# Patient Record
Sex: FEMALE | Race: WHITE | NOT HISPANIC OR LATINO | Employment: OTHER | URBAN - METROPOLITAN AREA
[De-identification: names, ages, dates, MRNs, and addresses within clinical notes are randomized per-mention and may not be internally consistent; named-entity substitution may affect disease eponyms.]

---

## 2023-03-08 ENCOUNTER — TRANSFERRED RECORDS (OUTPATIENT)
Dept: HEALTH INFORMATION MANAGEMENT | Facility: CLINIC | Age: 68
End: 2023-03-08

## 2023-03-13 ENCOUNTER — TRANSFERRED RECORDS (OUTPATIENT)
Dept: HEALTH INFORMATION MANAGEMENT | Facility: CLINIC | Age: 68
End: 2023-03-13

## 2023-04-26 ENCOUNTER — MEDICAL CORRESPONDENCE (OUTPATIENT)
Dept: HEALTH INFORMATION MANAGEMENT | Facility: CLINIC | Age: 68
End: 2023-04-26

## 2023-05-02 NOTE — TELEPHONE ENCOUNTER
SPINE PATIENTS - NEW PROTOCOL PREVISIT    RECORDS RECEIVED FROM: DENIS   REASON FOR VISIT: SI bone fusion, 2nd opinion   Date of Appt: 05/05/2023   NOTES (FOR ALL VISITS) STATUS DETAILS   OFFICE NOTE from referring provider N/A    OFFICE NOTE from other specialist Care Everywhere 04/20/2023 PT [facility]   04/10/2023 [facility]  05/17/2022 [facility]   DISCHARGE SUMMARY from hospital N/A    DISCHARGE REPORT from ER N/A    OPERATIVE REPORT Care Everywhere 01/14/2021 Lumbar Medial Branch Radiofrequency Ablation, levels:  right L4  right L5  right Sacral ala  This corresponds to the L4/5, L5/S1 facet joints   EMG REPORT N/A    MEDICATION LIST N/A    IMAGING  (FOR ALL VISITS)     MRI (HEAD, NECK, SPINE) Received 03/13/2023 lumbar spine   XRAY (SPINE) *NEUROSURGERY* Received 03/13/2023 pelvis  02/25/2023 lumbar spine   CT (HEAD, NECK, SPINE) N/A       Images in PACS

## 2023-05-05 ENCOUNTER — PRE VISIT (OUTPATIENT)
Dept: NEUROSURGERY | Facility: CLINIC | Age: 68
End: 2023-05-05

## 2023-05-05 ENCOUNTER — OFFICE VISIT (OUTPATIENT)
Dept: NEUROSURGERY | Facility: CLINIC | Age: 68
End: 2023-05-05
Payer: MEDICARE

## 2023-05-05 VITALS
HEART RATE: 62 BPM | WEIGHT: 163.4 LBS | HEIGHT: 62 IN | DIASTOLIC BLOOD PRESSURE: 65 MMHG | BODY MASS INDEX: 30.07 KG/M2 | SYSTOLIC BLOOD PRESSURE: 105 MMHG

## 2023-05-05 DIAGNOSIS — M53.3 SI (SACROILIAC) JOINT DYSFUNCTION: Primary | ICD-10-CM

## 2023-05-05 PROCEDURE — 99204 OFFICE O/P NEW MOD 45 MIN: CPT | Performed by: ORTHOPAEDIC SURGERY

## 2023-05-05 RX ORDER — ESCITALOPRAM OXALATE 20 MG/1
20 TABLET ORAL EVERY MORNING
COMMUNITY
Start: 2022-04-20

## 2023-05-05 RX ORDER — MELOXICAM 7.5 MG/1
TABLET ORAL
COMMUNITY
Start: 2023-03-15 | End: 2023-06-15

## 2023-05-05 RX ORDER — FAMOTIDINE 20 MG/1
20 TABLET, FILM COATED ORAL EVERY EVENING
COMMUNITY
Start: 2022-11-14

## 2023-05-05 RX ORDER — VITAMIN B COMPLEX
1 TABLET ORAL EVERY MORNING
COMMUNITY

## 2023-05-05 RX ORDER — TRAZODONE HYDROCHLORIDE 50 MG/1
50 TABLET, FILM COATED ORAL AT BEDTIME
COMMUNITY
Start: 2023-03-21

## 2023-05-05 RX ORDER — IBUPROFEN 600 MG/1
600 TABLET, FILM COATED ORAL EVERY 6 HOURS PRN
Status: ON HOLD | COMMUNITY
End: 2023-07-06

## 2023-05-05 RX ORDER — LEVETIRACETAM 750 MG/1
750 TABLET ORAL 2 TIMES DAILY
COMMUNITY
Start: 2022-07-26

## 2023-05-05 NOTE — LETTER
5/5/2023         RE: Isabel Hercules  [address]  [facility] [address]        Dear Colleague,    Thank you for referring your patient, Isabel Hercules, to the [facility]UROS[facility]ERY [facility]IC [place]. Please see a copy of my visit note below.    Spine Surgery Consultation    REFERRING PHYSICIAN: Lexy Clifford   PRIMARY CARE PHYSICIAN: No primary care provider on file.           Chief Complaint:   New Patient      History of Present Illness:  Symptom Profile Including: location of symptoms, onset, severity, exacerbating/alleviating factors, previous treatments:        Isabel Hercules is a 67 year old female who presents today for evaluation of right-sided SI joint dysfunction and bursitis.  She is been dealing with this for several years but has been gradually worsening particular over the last few months.  She has done 2 rounds of physical therapy which focused on hamstring gluteal and hip abductor strengthening.  She had a greater trochanteric injection which provided no benefit.  She also had an epidural injection which was not helpful.  More recently she had an SI joint injection which was a lidocaine only injection and provided relief for about 3 days.  The symptoms occasionally radiate down the back of the leg were mostly centered around the posterior spine on the right side centered around the SI joint somewhat into the buttock and lateral thigh.  It is worse with activity and improves some with rest.  She is done Tylenol and anti-inflammatories and muscle relaxants in addition to the injections and rounds of therapy noted above.  She says the pain can be a 3 out of 10 almost every day sometimes it will spike to higher levels when she has been more active, up to 8 or 9 out of 10.  5 out of 10 is how high it gets on most days.  Her JOSÉ MIGUEL is 30.         Past Medical History:   No past medical history on file.         Past Surgical History:   No past surgical history on file.        "  Social History:     Social History     Tobacco Use     Smoking status: Not on file     Smokeless tobacco: Not on file   Substance Use Topics     Alcohol use: Not on file            Family History:   No family history on file.         Allergies:     Allergies   Allergen Reactions     Seasonal Allergies             Medications:     Current Outpatient Medications   Medication     escitalopram (LEXAPRO) 20 MG tablet     famotidine (PEPCID) 20 MG tablet     ibuprofen (ADVIL/MOTRIN) 600 MG tablet     levETIRAcetam (KEPPRA) 750 MG tablet     meloxicam (MOBIC) 7.5 MG tablet     omeprazole (PRILOSEC) 20 MG DR capsule     traZODone (DESYREL) 50 MG tablet     Vitamin D3 (CHOLECALCIFEROL) 25 mcg (1000 units) tablet     No current facility-administered medications for this visit.             Review of Systems:     A 10 point ROS was performed and reviewed. Specific responses to these questions are noted at the end of the document.         Physical Exam:   Vitals: /65 (BP Location: Right arm, Patient Position: Sitting, Cuff Size: Adult Regular)   Pulse 62   Ht 1.575 m (5' 2\")   Wt 74.1 kg (163 lb 6.4 oz)   BMI 29.89 kg/m    Constitutional: awake, alert, cooperative, no apparent distress, appears stated age.    Eyes: The sclera are white.  Ears, Nose, Throat: The trachea is midline.  Psychiatric: The patient has a normal affect.  Respiratory: breathing non-labored  Cardiovascular: The extremities are warm and perfused.  Skin: no obvious rashes or lesions.  Musculoskeletal, Neurologic, and Spine:          Lumbar Spine:    Appearance - No gross stepoffs or deformities    Motor -     L2-3: Hip flexion 5/5 R and 5/5 L strength          L3/4:  Knee extension R 5/5 and L 5/5 strength         L4/5:  Foot dorsiflexion R 5/5 L 5/5 and               S1:  Plantarflexion/Peroneal Muscles  R 5/5 and L 5/5 strength    Sensation: intact to light touch L3-S1 distribution BLE      Neurologic:      REFLEXES Left Right   Biceps 1+ 1+ "   Triceps 1+ 1+   Brachioradialis 1+ 1+   Patella 1+ 1+   Ankle jerk 1+ 1+   Babinski No upgoing great toe No upgoing great toe   Clonus 0 beats 0 beats     Hip Exam:   She is tender over the right greater trochanter.  Positive hip thrust, lateral pelvic compression anterior pelvic compression and Gaenslen's.  She has a positive Cherri finger sign.    Alignment:  Patient stands with a neutral standing sagittal balance.           Imaging:   We ordered and independently reviewed new radiographs at this clinic visit. The results were discussed with the patient.  Findings include:    Lumbar radiographs show multilevel spondylosis    Lumbar MRI shows multilevel spondylosis but no obvious severe stenosis.  There is some inflammation around the bilateral SI joints with obvious spondylosis and degenerative changes.             Assessment and Plan:   Assessment:  67 year old female with lumbar spondylosis, greater trochanteric bursitis and right SI joint dysfunction.     Plan:  We discussed that she does have multiple sources of potential pain, but the SI joint seems to be the most obvious.  She responded best to injections there and the physical exam is most consistent with SI joint pathology.  We discussed options moving forward.  She has a second injection scheduled for next week.  I like to do a phone follow-up with her to assess her progress after that injection.  At that time she will have met all standard nonoperative treatment criteria.  If the injections are helpful explained he can be repeated 3-4 times a year.  The main alternative to repeat injections and therapy would be to consider a minimally invasive SI joint fusion surgery.  I went over the risk and benefits of this including risk of infection, implant malposition, neurologic deficit, pain advancement resulting in visceral injury, risk of nonunion and incomplete symptom relief.  We also talked about the risk the contralateral side might need to be done at  some point in the future.  She is going to consider these options.  If she wishes to proceed with surgery we will discuss this further after the phone visit in a couple of weeks.      Respectfully,  Steven Nunez MD  Spine Surgery  [facility]        Again, thank you for allowing me to participate in the care of your patient.        Sincerely,        Steven Nunez MD

## 2023-05-05 NOTE — PROGRESS NOTES
Spine Surgery Consultation    REFERRING PHYSICIAN: Lexy Clifford   PRIMARY CARE PHYSICIAN: No primary care provider on file.           Chief Complaint:   New Patient      History of Present Illness:  Symptom Profile Including: location of symptoms, onset, severity, exacerbating/alleviating factors, previous treatments:        Isabel Hercules is a 67 year old female who presents today for evaluation of right-sided SI joint dysfunction and bursitis.  She is been dealing with this for several years but has been gradually worsening particular over the last few months.  She has done 2 rounds of physical therapy which focused on hamstring gluteal and hip abductor strengthening.  She had a greater trochanteric injection which provided no benefit.  She also had an epidural injection which was not helpful.  More recently she had an SI joint injection which was a lidocaine only injection and provided relief for about 3 days.  The symptoms occasionally radiate down the back of the leg were mostly centered around the posterior spine on the right side centered around the SI joint somewhat into the buttock and lateral thigh.  It is worse with activity and improves some with rest.  She is done Tylenol and anti-inflammatories and muscle relaxants in addition to the injections and rounds of therapy noted above.  She says the pain can be a 3 out of 10 almost every day sometimes it will spike to higher levels when she has been more active, up to 8 or 9 out of 10.  5 out of 10 is how high it gets on most days.  Her JOSÉ MIGUEL is 30.         Past Medical History:   No past medical history on file.         Past Surgical History:   No past surgical history on file.         Social History:     Social History     Tobacco Use     Smoking status: Not on file     Smokeless tobacco: Not on file   Substance Use Topics     Alcohol use: Not on file            Family History:   No family history on file.         Allergies:     Allergies   Allergen  "Reactions     Seasonal Allergies             Medications:     Current Outpatient Medications   Medication     escitalopram (LEXAPRO) 20 MG tablet     famotidine (PEPCID) 20 MG tablet     ibuprofen (ADVIL/MOTRIN) 600 MG tablet     levETIRAcetam (KEPPRA) 750 MG tablet     meloxicam (MOBIC) 7.5 MG tablet     omeprazole (PRILOSEC) 20 MG DR capsule     traZODone (DESYREL) 50 MG tablet     Vitamin D3 (CHOLECALCIFEROL) 25 mcg (1000 units) tablet     No current facility-administered medications for this visit.             Review of Systems:     A 10 point ROS was performed and reviewed. Specific responses to these questions are noted at the end of the document.         Physical Exam:   Vitals: /65 (BP Location: Right arm, Patient Position: Sitting, Cuff Size: Adult Regular)   Pulse 62   Ht 1.575 m (5' 2\")   Wt 74.1 kg (163 lb 6.4 oz)   BMI 29.89 kg/m    Constitutional: awake, alert, cooperative, no apparent distress, appears stated age.    Eyes: The sclera are white.  Ears, Nose, Throat: The trachea is midline.  Psychiatric: The patient has a normal affect.  Respiratory: breathing non-labored  Cardiovascular: The extremities are warm and perfused.  Skin: no obvious rashes or lesions.  Musculoskeletal, Neurologic, and Spine:          Lumbar Spine:    Appearance - No gross stepoffs or deformities    Motor -     L2-3: Hip flexion 5/5 R and 5/5 L strength          L3/4:  Knee extension R 5/5 and L 5/5 strength         L4/5:  Foot dorsiflexion R 5/5 L 5/5 and               S1:  Plantarflexion/Peroneal Muscles  R 5/5 and L 5/5 strength    Sensation: intact to light touch L3-S1 distribution BLE      Neurologic:      REFLEXES Left Right   Biceps 1+ 1+   Triceps 1+ 1+   Brachioradialis 1+ 1+   Patella 1+ 1+   Ankle jerk 1+ 1+   Babinski No upgoing great toe No upgoing great toe   Clonus 0 beats 0 beats     Hip Exam:   She is tender over the right greater trochanter.  Positive hip thrust, lateral pelvic compression anterior " pelvic compression and Gaenslen's.  She has a positive Cherri finger sign.    Alignment:  Patient stands with a neutral standing sagittal balance.           Imaging:   We ordered and independently reviewed new radiographs at this clinic visit. The results were discussed with the patient.  Findings include:    Lumbar radiographs show multilevel spondylosis    Lumbar MRI shows multilevel spondylosis but no obvious severe stenosis.  There is some inflammation around the bilateral SI joints with obvious spondylosis and degenerative changes.             Assessment and Plan:   Assessment:  67 year old female with lumbar spondylosis, greater trochanteric bursitis and right SI joint dysfunction.     Plan:  1. We discussed that she does have multiple sources of potential pain, but the SI joint seems to be the most obvious.  She responded best to injections there and the physical exam is most consistent with SI joint pathology.  2. We discussed options moving forward.  She has a second injection scheduled for next week.  I like to do a phone follow-up with her to assess her progress after that injection.  At that time she will have met all standard nonoperative treatment criteria.  3. If the injections are helpful explained he can be repeated 3-4 times a year.  The main alternative to repeat injections and therapy would be to consider a minimally invasive SI joint fusion surgery.  I went over the risk and benefits of this including risk of infection, implant malposition, neurologic deficit, pain advancement resulting in visceral injury, risk of nonunion and incomplete symptom relief.  We also talked about the risk the contralateral side might need to be done at some point in the future.  She is going to consider these options.  If she wishes to proceed with surgery we will discuss this further after the phone visit in a couple of weeks.      Respectfully,  Steven Nunez MD  Spine Surgery  [facility]  [place]

## 2023-05-18 ENCOUNTER — TELEPHONE (OUTPATIENT)
Dept: NEUROSURGERY | Facility: CLINIC | Age: 68
End: 2023-05-18
Payer: MEDICARE

## 2023-05-18 NOTE — TELEPHONE ENCOUNTER
Left Voicemail (1st Attempt) for the patient to call back and schedule the following:    Appointment type: Return  Provider: Dr. Nunez  Return date: Mon 5/22 or Tue 2[place]23 [place] [facility] [place]  Specialty phone number: [phone]  Additional appointment(s) needed:   Additonal Notes:     Dr. Nunez will be doing urgent surgery on 5/19/2023, the appointment needs to be rescheduled. Patient can see Dr. Nunez at the [facility] [place] on 5/22/2023 or Tues 5/23/2023, or rescheduled to the next available appointment in .    Also sent a REVENTIVE message.    Adelina R/Procedure    [facility]   Neurology, NeuroSurgery, NeuroPsychology and Pain Management Specialties  Medical/Surgical Adult Specialties

## 2023-05-19 ENCOUNTER — TELEPHONE (OUTPATIENT)
Dept: NEUROSURGERY | Facility: CLINIC | Age: 68
End: 2023-05-19

## 2023-05-19 ENCOUNTER — VIRTUAL VISIT (OUTPATIENT)
Dept: NEUROSURGERY | Facility: CLINIC | Age: 68
End: 2023-05-19
Payer: MEDICARE

## 2023-05-19 DIAGNOSIS — M53.3 SI (SACROILIAC) JOINT DYSFUNCTION: Primary | ICD-10-CM

## 2023-05-19 PROCEDURE — 99442 PR PHYSICIAN TELEPHONE EVALUATION 11-20 MIN: CPT | Performed by: ORTHOPAEDIC SURGERY

## 2023-05-19 NOTE — PROGRESS NOTES
Isabel is a 67 year old who is being evaluated via a billable telephone visit.      What phone number would you like to be contacted at? [phone]  How would you like to obtain your AVS? Alis       Diagnosis: SI Joint Pain    Isabel had about 5-6 hours of relief after her SI joint injection.  After the injection she had 100% relief during this time.   She feels like she was in the spot and they found it during the injection.    The question is what we should do next.  We discussed continued non-op management with injections such as continued PT, oral medications and repeating injections, or an SI belt.    The other alternative would be a minimally invasive SI joint fusion.  We discussed risks including incomplete symptom relief, risk of contralateral pain, risk of infection, implant malposition, risk of infection, and also the recovery and expected outcomes after surgery.  She had a number of intelligent questions.      She would like to try the minimally invasive SI fusion in July.

## 2023-05-19 NOTE — LETTER
5/19/2023         RE: Isabel Hercules  [address]  [facility] [address]        Dear Colleague,    Thank you for referring your patient, Isabel Hercules, to the [facility]OS[facility]IC [place]. Please see a copy of my visit note below.    Isabel is a 67 year old who is being evaluated via a billable telephone visit.      What phone number would you like to be contacted at? [phone]  How would you like to obtain your AVS? Alis       Diagnosis: SI Joint Pain    Isabel had about 5-6 hours of relief after her SI joint injection.  After the injection she had 100% relief during this time.   She feels like she was in the spot and they found it during the injection.    The question is what we should do next.  We discussed continued non-op management with injections such as continued PT, oral medications and repeating injections, or an SI belt.    The other alternative would be a minimally invasive SI joint fusion.  We discussed risks including incomplete symptom relief, risk of contralateral pain, risk of infection, implant malposition, risk of infection, and also the recovery and expected outcomes after surgery.  She had a number of intelligent questions.      She would like to try the minimally invasive SI fusion in July.        Again, thank you for allowing me to participate in the care of your patient.        Sincerely,        Steven Nunez MD

## 2023-05-19 NOTE — TELEPHONE ENCOUNTER
Writer spoke with patient and went over SI joint injection pre-op checklist.     Pt confirmed that she is a non-smoker and has completed extensive PT over the years, including recent PT for her low back through [facility]. These notes are in C/E. She reports having completed 2 recent SI joint injections (one at [facility] and another at [facility]) but does not recall if these were diagnostic or therapeutic. Writer will review records to verify. Pt recalls an SI joint or Xray of her pelvis through [facility] and MRI or CT of her pelvis. Confirmed recent lumbar MRI in PACS. Will notify pt if additional orders need to be placed.         Afia Swartz, RNCC  Neurology/Neurosurgery

## 2023-05-22 NOTE — TELEPHONE ENCOUNTER
Writer confirmed with [facility] that SI joint injection performed in March was a therapeutic injection and kenalog was also administered with anesthetic. For insurance purposes, she will need another diagnostic injection with at least 75% improvement. Pt will also require CT of the pelvis within the last 6 months which has not been completed after looking through records. Pt requests both orders be sent to [facility] and that SI joint injection be done with conscious sedation if possible. Orders placed and to be faxed to [facility] to scheduling.       Afia Swartz, RNCC  Neurology/Neurosurgery

## 2023-05-23 NOTE — CONFIDENTIAL NOTE
CT and SI joint injection orders faxed to [facility]. Confirmation of successful delivery was received.    Isabelle Wade RN Care Coordinator   Neurology/Neurosurgery/PM&R/ Pain Management

## 2023-05-26 ENCOUNTER — TELEPHONE (OUTPATIENT)
Dept: ORTHOPEDICS | Facility: CLINIC | Age: 68
End: 2023-05-26
Payer: MEDICARE

## 2023-05-26 NOTE — TELEPHONE ENCOUNTER
Phoned patient to get her schedule for surgery with Dr. Nunez.     Patient stated that she has a concern and would like that concern/question answered prior to scheduling.     Patient stated that she had spoken with two different people that gave her contradicting information regarding her prior authorization for her surgery approval.     Patient stated that she spoke with someone, she does not remember who, that explained that she will need an additional injection and CT in order for Medicare to approve her SI fusion.   She also spoke with someone else who said that she does not need anything else done prior to surgery--since she has medicare, she'll get her surgery approved.     Patient would like clarification before proceeding with scheduling. Explained that surgery coordinator will reach out to our PA department for their assistance, then , PA, or RN will call back by early next week.     Patient understood and is agreeable with the plan. She had no other questions or concerns.

## 2023-05-30 ENCOUNTER — TELEPHONE (OUTPATIENT)
Dept: ORTHOPEDICS | Facility: CLINIC | Age: 68
End: 2023-05-30
Payer: MEDICARE

## 2023-05-30 NOTE — TELEPHONE ENCOUNTER
RN received a voicemail from patient on Friday 05/26/2023. Patient stating she received instruction on SI Joint Fusion information and required steps for this. RN routing to appropriate department.    Mona Millan RN

## 2023-05-31 ENCOUNTER — TELEPHONE (OUTPATIENT)
Dept: ORTHOPEDICS | Facility: CLINIC | Age: 68
End: 2023-05-31
Payer: MEDICARE

## 2023-06-01 ENCOUNTER — TRANSFERRED RECORDS (OUTPATIENT)
Dept: HEALTH INFORMATION MANAGEMENT | Facility: CLINIC | Age: 68
End: 2023-06-01
Payer: MEDICARE

## 2023-06-01 NOTE — TELEPHONE ENCOUNTER
FUTURE VISIT INFORMATION      SURGERY INFORMATION:    Date: 6/22/23    Location: [facility] or    Surgeon:  Steven Nunez MD    Anesthesia Type:  general    Procedure: minimally invasive right sacroiliac joint fusion with stealth navigation and SIBONE implants    Consult: virtual visit 5/19    RECORDS REQUESTED FROM:       Primary Care Provider: Angelita Barrett APRN, CNP - [facility]

## 2023-06-05 ENCOUNTER — TELEPHONE (OUTPATIENT)
Dept: ORTHOPEDICS | Facility: CLINIC | Age: 68
End: 2023-06-05

## 2023-06-05 NOTE — TELEPHONE ENCOUNTER
[facility] Call Center    Phone Message    May a detailed message be left on voicemail: yes     Reason for Call: Other: Patient is requesting that a member of the team of Dr Nunez call her back to discuss an upcoming procedure.     Action Taken: Message routed to:  [facility] ([facility]): Patient is requesting that a member of the team of Dr Nunez call her back to discuss an upcoming procedure.    Travel Screening: Not Applicable

## 2023-06-05 NOTE — TELEPHONE ENCOUNTER
Phoned patient to reschedule her surgery with Dr Nunez. Patient rescheduled from 6/22/23 to 7/12/23. Patient will maintain her current PAC visit and appt with Dr Nunez in June.

## 2023-06-05 NOTE — CONFIDENTIAL NOTE
RN called the pt to discuss her questions. She has an 8 hour long road trip planned for July 4th and wonders if this would be too soon to travel after having surgery on 6/22. I explained that postoperatively we advise pt's to avoid prolonged sitting or bedrest longer than 30 minutes and that she will still be partial weight bearing at that time.   She explained that typically her pain level is 3-4/10 and wonders if maybe she should wait until a little later in the summer to have surgery. She will like to know if there are any dates for surgery in late July. Will forward this on to the surgery scheduler to take a look. If she moves her appt out then she will also have to move her PAC visit as well.     She had multiple other questions for the doctor and will keep her appt with him on the 16th to discuss further.    Isabelle Wade RN Care Coordinator   Neurology/Neurosurgery/PM&R/ Pain Management

## 2023-06-15 ENCOUNTER — TELEPHONE (OUTPATIENT)
Dept: ORTHOPEDICS | Facility: CLINIC | Age: 68
End: 2023-06-15

## 2023-06-15 ENCOUNTER — ANESTHESIA EVENT (OUTPATIENT)
Dept: SURGERY | Facility: CLINIC | Age: 68
End: 2023-06-15
Payer: MEDICARE

## 2023-06-15 ENCOUNTER — LAB (OUTPATIENT)
Dept: LAB | Facility: CLINIC | Age: 68
End: 2023-06-15
Payer: MEDICARE

## 2023-06-15 ENCOUNTER — PRE VISIT (OUTPATIENT)
Dept: SURGERY | Facility: CLINIC | Age: 68
End: 2023-06-15

## 2023-06-15 ENCOUNTER — OFFICE VISIT (OUTPATIENT)
Dept: SURGERY | Facility: CLINIC | Age: 68
End: 2023-06-15
Payer: MEDICARE

## 2023-06-15 VITALS
OXYGEN SATURATION: 95 % | SYSTOLIC BLOOD PRESSURE: 111 MMHG | BODY MASS INDEX: 30.33 KG/M2 | HEIGHT: 62 IN | DIASTOLIC BLOOD PRESSURE: 66 MMHG | TEMPERATURE: 97.5 F | WEIGHT: 164.8 LBS | RESPIRATION RATE: 16 BRPM | HEART RATE: 52 BPM

## 2023-06-15 DIAGNOSIS — M53.3 SI (SACROILIAC) JOINT DYSFUNCTION: ICD-10-CM

## 2023-06-15 DIAGNOSIS — Z01.818 PRE-OP EXAMINATION: ICD-10-CM

## 2023-06-15 DIAGNOSIS — Z01.818 PRE-OP EXAMINATION: Primary | ICD-10-CM

## 2023-06-15 LAB
ANION GAP SERPL CALCULATED.3IONS-SCNC: 9 MMOL/L (ref 7–15)
BUN SERPL-MCNC: 15.1 MG/DL (ref 8–23)
CALCIUM SERPL-MCNC: 9.6 MG/DL (ref 8.8–10.2)
CHLORIDE SERPL-SCNC: 104 MMOL/L (ref 98–107)
CREAT SERPL-MCNC: 0.72 MG/DL (ref 0.51–0.95)
DEPRECATED HCO3 PLAS-SCNC: 28 MMOL/L (ref 22–29)
ERYTHROCYTE [DISTWIDTH] IN BLOOD BY AUTOMATED COUNT: 13.1 % (ref 10–15)
GFR SERPL CREATININE-BSD FRML MDRD: >90 ML/MIN/1.73M2
GLUCOSE SERPL-MCNC: 94 MG/DL (ref 70–99)
HCT VFR BLD AUTO: 44.8 % (ref 35–47)
HGB BLD-MCNC: 14.9 G/DL (ref 11.7–15.7)
MCH RBC QN AUTO: 32 PG (ref 26.5–33)
MCHC RBC AUTO-ENTMCNC: 33.3 G/DL (ref 31.5–36.5)
MCV RBC AUTO: 96 FL (ref 78–100)
PLATELET # BLD AUTO: 275 10E3/UL (ref 150–450)
POTASSIUM SERPL-SCNC: 4.1 MMOL/L (ref 3.4–5.3)
RBC # BLD AUTO: 4.66 10E6/UL (ref 3.8–5.2)
SODIUM SERPL-SCNC: 141 MMOL/L (ref 136–145)
WBC # BLD AUTO: 4.7 10E3/UL (ref 4–11)

## 2023-06-15 PROCEDURE — 80048 BASIC METABOLIC PNL TOTAL CA: CPT | Performed by: PATHOLOGY

## 2023-06-15 PROCEDURE — 36415 COLL VENOUS BLD VENIPUNCTURE: CPT | Performed by: PATHOLOGY

## 2023-06-15 PROCEDURE — 85027 COMPLETE CBC AUTOMATED: CPT | Performed by: PATHOLOGY

## 2023-06-15 PROCEDURE — 99204 OFFICE O/P NEW MOD 45 MIN: CPT | Performed by: PHYSICIAN ASSISTANT

## 2023-06-15 ASSESSMENT — PAIN SCALES - GENERAL: PAINLEVEL: MILD PAIN (3)

## 2023-06-15 ASSESSMENT — LIFESTYLE VARIABLES: TOBACCO_USE: 1

## 2023-06-15 ASSESSMENT — ENCOUNTER SYMPTOMS: SEIZURES: 1

## 2023-06-15 NOTE — PATIENT INSTRUCTIONS
Preparing for Your Surgery      Name:  Isabel Hercules   MRN:  [id number]   :  1955   Today's Date:  6/15/2023       Arriving for surgery:  Surgery date:  23  Arrival time:  9:30 am    Please come to:     Please come to:      [facility] [address]  [address] [place]  [address]  The Green Ramp for patients and visitors is located beneath the [facility]. The parking facility entrance is at the intersection of [address] [place] [address] and [address]. Patients and visitors who self-park will receive the reduced hospital parking rate (no ticket validation needed).  fitogram parking, located at the [facility] main entrance on [address] [place] [address], is available Monday - Friday from 7 am to 3:30 pm.  Discounted parking pass options can be purchased from  attendants during business hours.  -Check in at the security desk in the [facility] ([facility]) Lobby. They will direct you to the correct elevators.  -Proceed to the 3rd floor, check in at the Adult Surgery Waiting Lounge. [phone]  If you are in need of directions, a wheelchair or escort please stop at the Information Desk in the lobby.  Inform the information person that you are here for surgery; a wheelchair and escort to [address] will be provided.   An escort to the Adult Surgery Waiting Lounge will be provided.    What can I eat or drink?  -  You may eat and drink normally up to 8 hours prior to arrival time. (Until 1:30 am)  -  You may have clear liquids until 2 hours prior to arrival time. (Until 7:30 am)    Examples of clear liquids:  Water  Clear broth  Juices (apple, white grape, white cranberry  and cider) without pulp  Noncarbonated, powder based beverages  (lemonade and Ubaldo-Aid)  Sodas (Sprite, 7-Up, ginger ale and seltzer)  Coffee or tea (without milk or cream)  Gatorade    -  No Alcohol or cannabis  products for at least 24 hours before surgery.     Which medicines can I take?    Hold Aspirin for 7 days before surgery.   Hold Multivitamins for 7 days before surgery.  Hold Supplements for 7 days before surgery.  Hold Ibuprofen (Advil, Motrin) for 3 day(s) before surgery--unless otherwise directed by surgeon.  Hold Naproxen (Aleve) for 4 days before surgery.    -  DO NOT take these medications the day of surgery:  Ibuprofen - stop 3 days before surgery  Vitamin D3    -  PLEASE TAKE these medications the night before or the day of surgery:  Escitalopram (Lexapro)  Famotidine (Pepcid)  Levetiracetam (Keppra)  Omeprazole (Prilosec)  Trazodone      How do I prepare myself?  - Please take 2 showers (one the night prior to surgery and one the morning of surgery) using Scrubcare or Hibiclens soap.    Use this soap only from the neck to your toes.     Leave the soap on your skin for one minute--then rinse thoroughly.      You may use your own shampoo and conditioner. No other hair products.   - Please remove all jewelry and body piercings.  - No lotions, deodorants or fragrance.  - No makeup or fingernail polish.   - Bring your ID and insurance card.    -If you have a Deep Brain Stimulator, Spinal Cord Stimulator, or any Neuro Stimulator device---you must bring the remote control to the hospital.      ALL PATIENTS GOING HOME THE SAME DAY OF SURGERY ARE REQUIRED TO HAVE A RESPONSIBLE ADULT TO DRIVE AND BE IN ATTENDANCE WITH THEM FOR 24 HOURS FOLLOWING SURGERY.    Covid testing policy as of 12/06/2022  Your surgeon will notify and schedule you for a COVID test if one is needed before surgery--please direct any questions or COVID symptoms to your surgeon      Questions or Concerns:    - For any questions regarding the day of surgery or your hospital stay, please contact the Pre Admission Nursing Office at [phone].       - If you have health changes between today and your surgery, please call your surgeon.       - For  questions after surgery, please call your surgeons office.           Current Visitor Guidelines     Visiting hours: 8 a.m. to 8:30 p.m.     Patients confirmed or suspected to have symptoms of COVID 19 or flu:     No visitors allowed for adult patients.   Children (under age 18) can have 1 named visitor.     People who are sick or showing symptoms of COVID 19 or flu:    Are not allowed to visit patients--we can only make exceptions in special situations.       Please follow these guidelines for your visit:          Please maintain social distance          Masking is optional--however at times you may be asked to wear a mask for the safety of yourself and others     Clean your hands with alcohol hand . Do this when you arrive at and leave the building and patient room,    And again after you touch your mask or anything in the room.     Go directly to and from the room you are visiting.     Stay in the patient s room during your visit. Limit going to other places in the hospital as much as possible     Leave bags and jackets at home or in the car.     For everyone s health, please don t come and go during your visit. That includes for smoking   during your visit.

## 2023-06-15 NOTE — TELEPHONE ENCOUNTER
Received call from patient requesting to move up her surgery date if possible. Patient rescheduled from 7/12/23 to 7/6/23.     Patient will wait for confirmation call with regard to new arrival time.

## 2023-06-15 NOTE — H&P
Pre-Operative H & P     CC:  Preoperative exam to assess for increased cardiopulmonary risk while undergoing surgery and anesthesia.    Date of Encounter: 6/15/2023  Primary Care Physician:  No Ref-Primary, Physician     Reason for visit:   Encounter Diagnoses   Name Primary?     Pre-op examination Yes     SI (sacroiliac) joint dysfunction        HPI  Isabel Hercules is a 67 year old female who presents for pre-operative H & P in preparation for  Procedure Information     Case: [id number] Date/Time: 07/12/23 1200    Procedure: minimally invasive right sacroiliac joint fusion with stealth navigation and SIBONE implants (Sacrum)    Anesthesia type: General    Diagnosis: SI (sacroiliac) joint dysfunction [M53.3]    Pre-op diagnosis: SI (sacroiliac) joint dysfunction [M53.3]    Location:  OR  /  OR    Providers: Steven Nunez MD          The patient is a 67-year-old woman with a past medical history significant for former smoker, seizures, prediabetes, GERD, insomnia, major depression and lumbar radiculopathy.  The patient has been meeting with Dr. Nunez and last seen virtually on 5/19/2023.  They discussed surgical treatment options and the patient has been scheduled for the procedure as above.    History is obtained from the patient and chart review    Hx of abnormal bleeding or anti-platelet use: none    Menstrual history: No LMP recorded (lmp unknown). Patient is postmenopausal.:      Past Medical History  Past Medical History:   Diagnosis Date     Gastroesophageal reflux disease with esophagitis      Insomnia      MDD (major depressive disorder)      Prediabetes      Seizures (H)      SI (sacroiliac) joint dysfunction        Past Surgical History  Past Surgical History:   Procedure Laterality Date     APPENDECTOMY       ARTHROSCOPY KNEE       C/SECTION, CLASSICAL       CARPAL TUNNEL RELEASE RT/LT       CATARACT EXTRACTION       CERVICAL DISC SURGERY      C3-5     DILATION AND CURETTAGE       EXC  BRST CYST TUMR/LES OPN M/F 1/>       TONSILLECTOMY       TOTAL KNEE ARTHROPLASTY Right      wisdom teeth extraction         Prior to Admission Medications  Current Outpatient Medications   Medication Sig Dispense Refill     escitalopram (LEXAPRO) 20 MG tablet Take 20 mg by mouth every morning       famotidine (PEPCID) 20 MG tablet Take 20 mg by mouth every evening       ibuprofen (ADVIL/MOTRIN) 600 MG tablet Take 600 mg by mouth every 6 hours as needed       levETIRAcetam (KEPPRA) 750 MG tablet Take 750 mg by mouth 2 times daily       omeprazole (PRILOSEC) 20 MG DR capsule Take 20 mg by mouth every morning       traZODone (DESYREL) 50 MG tablet Take 50 mg by mouth At Bedtime       Vitamin D3 (CHOLECALCIFEROL) 25 mcg (1000 units) tablet Take 1 tablet by mouth every morning       meloxicam (MOBIC) 7.5 MG tablet TAKE 1 TABLET BY MOUTH TWICE DAILY WITH FOOD AS NEEDED FOR PAIN         Allergies  Allergies   Allergen Reactions     Bacitracin      Seasonal Allergies        Social History  Social History     Socioeconomic History     Marital status:      Spouse name: Not on file     Number of children: Not on file     Years of education: Not on file     Highest education level: Not on file   Occupational History     Not on file   Tobacco Use     Smoking status: Former     Types: Cigarettes     Quit date:      Years since quittin.4     Smokeless tobacco: Never   Vaping Use     Vaping status: Not on file   Substance and Sexual Activity     Alcohol use: Yes     Alcohol/week: 7.0 standard drinks of alcohol     Types: 7 Glasses of wine per week     Drug use: Not Currently     Sexual activity: Not on file   Other Topics Concern     Not on file   Social History Narrative     Not on file     Social Determinants of Health     Financial Resource Strain: Not on file   Food Insecurity: Not on file   Transportation Needs: Not on file   Physical Activity: Not on file   Stress: Not on file   Social Connections: Not on file  "  Intimate Partner Violence: Not on file   Housing Stability: Not on file       Family History  Family History   Problem Relation Age of Onset     Anesthesia Reaction No family hx of      Venous thrombosis No family hx of        Review of Systems  The complete review of systems is negative other than noted in the HPI or here.   Anesthesia Evaluation   Pt has had prior anesthetic. Type: General, Regional and MAC.    History of anesthetic complications   With the patient's total knee arthroplasty in 2020 she had desaturation after multiple opioids for pain control in the PACU, received a femoral block and ultimately was transferred to [facility].    ROS/MED HX  ENT/Pulmonary:     (+) tobacco use, Past use,     Neurologic:     (+) seizures, last seizure: 5 years ago, features: complex partial seizure,  (-) no CVA and no TIA   Cardiovascular:     (+) -----Previous cardiac testing   Echo: Date: Results:    Stress Test: Date: Results:    ECG Reviewed: Date: 5/1/21 Results:  Sinus bradycardia   Cath: Date: Results:      METS/Exercise Tolerance: 4 - Raking leaves, gardening    Hematologic:  - neg hematologic  ROS     Musculoskeletal: Comment: Lumbar radiculopathy    SI joint dysfunction        GI/Hepatic:     (+) GERD, Asymptomatic on medication,     Renal/Genitourinary:  - neg Renal ROS     Endo: Comment: Pre-diabetes      Psychiatric/Substance Use:     (+) psychiatric history depression and other (comment) (insomnia )     Infectious Disease:  - neg infectious disease ROS     Malignancy:  - neg malignancy ROS     Other:  - neg other ROS          /66 (BP Location: Right arm, Patient Position: Sitting, Cuff Size: Adult Regular)   Pulse 52   Temp 97.5  F (36.4  C) (Oral)   Resp 16   Ht 1.575 m (5' 2\")   Wt 74.8 kg (164 lb 12.8 oz)   LMP  (LMP Unknown)   SpO2 95%   BMI 30.14 kg/m      Physical Exam   Constitutional: Awake, alert, cooperative, no apparent distress, and appears stated age.  Eyes: Pupils " equal, round and reactive to light, extra ocular muscles intact, sclera clear, conjunctiva normal.  HENT: Normocephalic, oral pharynx with moist mucus membranes, good dentition. No goiter appreciated.   Respiratory: Clear to auscultation bilaterally, no crackles or wheezing.  Cardiovascular: Regular rate and rhythm, normal S1 and S2, and no murmur noted.  Carotids +2, no bruits. No edema. Palpable pulses to radial  DP and PT arteries.   GI: Normal bowel sounds, soft, non-distended, non-tender, no masses palpated, no hepatosplenomegaly.    Lymph/Hematologic: No cervical lymphadenopathy and no supraclavicular lymphadenopathy.  Genitourinary:  defer  Skin: Warm and dry.  No rashes at anticipated surgical site.   Musculoskeletal: Full ROM of neck. There is no redness, warmth, or swelling of the joints. Gross motor strength is normal.    Neurologic: Awake, alert, oriented to name, place and time. Cranial nerves II-XII are grossly intact. Gait is normal.   Neuropsychiatric: Calm, cooperative. Normal affect.     Prior Labs/Diagnostic Studies   All labs and imaging personally reviewed     EKG/ stress test - if available please see in ROS above       The patient's records and results personally reviewed by this provider.     Outside records reviewed from: Care Everywhere    LAB/DIAGNOSTIC STUDIES TODAY:     Latest Reference Range & Units 06/15/23 12:04   Sodium 136 - 145 mmol/L 141   Potassium 3.4 - 5.3 mmol/L 4.1   Chloride 98 - 107 mmol/L 104   Carbon Dioxide (CO2) 22 - 29 mmol/L 28   Urea Nitrogen 8.0 - 23.0 mg/dL 15.1   Creatinine 0.51 - 0.95 mg/dL 0.72   GFR Estimate >60 mL/min/1.73m2 >90   Calcium 8.8 - 10.2 mg/dL 9.6   Anion Gap 7 - 15 mmol/L 9   Glucose 70 - 99 mg/dL 94   WBC 4.0 - 11.0 10e3/uL 4.7   Hemoglobin 11.7 - 15.7 g/dL 14.9   Hematocrit 35.0 - 47.0 % 44.8   Platelet Count 150 - 450 10e3/uL 275   RBC Count 3.80 - 5.20 10e6/uL 4.66   MCV 78 - 100 fL 96   MCH 26.5 - 33.0 pg 32.0   MCHC 31.5 - 36.5 g/dL 33.3  "  RDW 10.0 - 15.0 % 13.1     Assessment      Isabel Hercules is a 67 year old female seen as a PAC referral for risk assessment and optimization for anesthesia.    Plan/Recommendations  Pt will be optimized for the proposed procedure.  See below for details on the assessment, risk, and preoperative recommendations    NEUROLOGY  - History of Seizure - Patient reports she's only had one seizure and this was a complex partial seizure. She hasn't had one since starting medications. Continue keppra  - Chronic Pain  On chronic opiates, morphine equivilant = None   -Post Op delirium risk factors:  No risk identified    ENT  - No current airway concerns.  Will need to be reassessed day of surgery.  Mallampati: III  TM: > 3    CARDIAC  - No history of CAD, Hypertension and Afib  - METS (Metabolic Equivalents)  Patient performs 4 or more METS exercise without symptoms            Total Score: 0      RCRI-Very low risk: Class 1 0.4% complication rate            Total Score: 0    ~ The patient is able to take a 10 minute walk or garden for 30 minutes but then will have more pain the next day. She denies any cardiac symptoms.     PULMONARY  - Obstructive Sleep Apnea  No current risk of obstructive sleep apnea   DENISSE Low Risk            Total Score: 1    DENISSE: Over 50 ys old      - Denies asthma or inhaler use  - Tobacco History      History   Smoking Status     Former     Types: Cigarettes     Quit date: 1984   Smokeless Tobacco     Never       GI  - GERD  Controlled on medications: Proton Pump Inhibitor  PONV High Risk  Total Score: 3           1 AN PONV: Pt is Female    1 AN PONV: Patient is not a current smoker    1 AN PONV: Intended Post Op Opioids        /RENAL  - Baseline Creatinine  0.81    ENDOCRINE    - BMI: Estimated body mass index is 30.14 kg/m  as calculated from the following:    Height as of this encounter: 1.575 m (5' 2\").    Weight as of this encounter: 74.8 kg (164 lb 12.8 oz).  Overweight (BMI 25.0-29.9)  - " Pre-diabetes - A1c 5.7 on 12/15/22    HEME  VTE Low Risk 0.26%            Total Score: 1    VTE: Greater than 59 yrs old      - No history of abnormal bleeding or antiplatelet use.      MSK  ~ SI (sacroiliac) joint dysfunction - procedure as above.     PSYCH  - MDD, insomnia - continue lexapro and trazodone.     ANESTHESIA  ~ The patient has had multiple times with anesthesia in the past. For her total knee arthroplasty she didn't have good pain control in the PACU and per report got opioids and had airway obstruction and desaturated to the 70's. (Please see further record under the anesthesia tab from the 6/10/20 surgery) She received a femoral block but continued to have issues so was transferred to [facility]. Per discharge summary the rest of her post op course was uneventful. I reviewed her anesthesia record with Dr. Gilbert who felt she likely had respiratory depression from the opioids and perhaps part of the spinal she received. He didn't feel that any of the documentation pointed towards a medication allergy with dilaudid. The patient does reports in general she is sensitive and doesn't want to be on a lot of opioids if possible post operatively.     Different anesthesia methods/types have been discussed with the patient, but they are aware that the final plan will be decided by the assigned anesthesia provider on the date of service.    Patient discussed with Dr. Gilbert.     The patient is optimized for their procedure. AVS with information on surgery time/arrival time, meds and NPO status given by nursing staff. No further diagnostic testing indicated.      On the day of service:     Prep time: 9 minutes  Visit time: 20 minutes  Documentation time: 20 minutes  ------------------------------------------  Total time: 49 minutes      Bertha Leung PA-C  Preoperative Assessment Center  [facility]  Clinic and Surgery Center  Phone: [phone]  Fax: [phone]

## 2023-06-16 ENCOUNTER — OFFICE VISIT (OUTPATIENT)
Dept: NEUROSURGERY | Facility: CLINIC | Age: 68
End: 2023-06-16
Payer: MEDICARE

## 2023-06-16 ENCOUNTER — TELEPHONE (OUTPATIENT)
Dept: NEUROSURGERY | Facility: CLINIC | Age: 68
End: 2023-06-16

## 2023-06-16 VITALS
HEART RATE: 66 BPM | DIASTOLIC BLOOD PRESSURE: 67 MMHG | SYSTOLIC BLOOD PRESSURE: 110 MMHG | BODY MASS INDEX: 30 KG/M2 | WEIGHT: 164 LBS

## 2023-06-16 DIAGNOSIS — M53.3 SI (SACROILIAC) JOINT DYSFUNCTION: Primary | ICD-10-CM

## 2023-06-16 PROCEDURE — 99214 OFFICE O/P EST MOD 30 MIN: CPT | Performed by: ORTHOPAEDIC SURGERY

## 2023-06-16 NOTE — TELEPHONE ENCOUNTER
A user error has taken place: encounter opened in error, closed for administrative reasons.  NANY Schwarz, NAIF ([facility])

## 2023-06-16 NOTE — PROGRESS NOTES
Spine Surgery Return Clinic Visit      Chief Complaint:   RECHECK      Interval HPI:  Symptom Profile Including: location of symptoms, onset, severity, exacerbating/alleviating factors, previous treatments:        Isabel Hercules is a 67 year old female who returns in follow-up today.  We are planning on a minimally invasive SI joint fusion and she returns with her  today with a number of intelligent questions.  The entirety of today's visit was spent in counseling imaging review and surgical decision making.            Past Medical History:     Past Medical History:   Diagnosis Date     Gastroesophageal reflux disease with esophagitis      Insomnia      MDD (major depressive disorder)      Prediabetes      Seizures (H)      SI (sacroiliac) joint dysfunction             Past Surgical History:     Past Surgical History:   Procedure Laterality Date     APPENDECTOMY       ARTHROSCOPY KNEE       C/SECTION, CLASSICAL       CARPAL TUNNEL RELEASE RT/LT       CATARACT EXTRACTION       CERVICAL DISC SURGERY      C3-5     DILATION AND CURETTAGE       EXC BRST CYST TUMR/LES OPN M/F 1/>       TONSILLECTOMY       TOTAL KNEE ARTHROPLASTY Right      wisdom teeth extraction              Social History:     Social History     Tobacco Use     Smoking status: Former     Types: Cigarettes     Quit date:      Years since quittin.4     Smokeless tobacco: Never   Vaping Use     Vaping status: Not on file   Substance Use Topics     Alcohol use: Yes     Alcohol/week: 7.0 standard drinks of alcohol     Types: 7 Glasses of wine per week            Family History:     Family History   Problem Relation Age of Onset     Anesthesia Reaction No family hx of      Venous thrombosis No family hx of             Allergies:     Allergies   Allergen Reactions     Bacitracin      Seasonal Allergies             Medications:     Current Outpatient Medications   Medication     escitalopram (LEXAPRO) 20 MG tablet     famotidine (PEPCID) 20 MG  tablet     ibuprofen (ADVIL/MOTRIN) 600 MG tablet     levETIRAcetam (KEPPRA) 750 MG tablet     omeprazole (PRILOSEC) 20 MG DR capsule     traZODone (DESYREL) 50 MG tablet     Vitamin D3 (CHOLECALCIFEROL) 25 mcg (1000 units) tablet     No current facility-administered medications for this visit.             Review of Systems:   A focused musculoskeletal and neurologic ROS was performed with pertinent positives and negatives noted in the HPI.  Additional systems were also reviewed and are documented at the bottom of the note.         Physical Exam:   Vitals: /67 (BP Location: Right arm, Patient Position: Sitting, Cuff Size: Adult Regular)   Pulse 66   Wt 74.4 kg (164 lb)   LMP  (LMP Unknown)   BMI 30.00 kg/m    Musculoskeletal, Neurologic, and Spine:     I did reexamine the right SI joint and she has a positive thigh thrust lateral compression and Cherri finger sign         Imaging:   We ordered and independently reviewed new radiographs at this clinic visit. The results were discussed with the patient. Findings include:     MRI of the lumbar spine March 13 does show moderate diffuse degenerative changes    Pelvic radiographs show no obvious hip pathology    Pelvic CT scan shows some SI joint sclerosis bilaterally       Assessment and Plan:     67 year old female with right SI joint pain, planning for minimally invasive SI joint fusion     Number of questions were answered.  She is having some right hip pain.  We will get a hip MRI to make sure there is no intra-articular pathology.  Otherwise SI joint exam was positive.  Had complete anesthetic relief with recent CT-guided SI joint injection.  Planning for surgery July 6, and will contact her after MRI of hip.    Risks and benefits of SI joint fusion were discussed including recovery, need for protected weightbearing, the risk of nonunion, risk of incomplete symptom relief and risk of implant malposition we also discussed the risk that the contralateral  side could need a fusion in the future.  She expressed understanding and would like to proceed.    30 minutes was spent in counseling imaging review and note preparation  Respectfully,  Steven Nunez MD  Spine Surgery  [facility]

## 2023-06-16 NOTE — LETTER
2023         RE: Isabel Hercules  [address]  [facility] [address]        Dear Colleague,    Thank you for referring your patient, Isabel Hercules, to the [facility] [place]. Please see a copy of my visit note below.    Spine Surgery Return Clinic Visit      Chief Complaint:   RECHECK      Interval HPI:  Symptom Profile Including: location of symptoms, onset, severity, exacerbating/alleviating factors, previous treatments:        Isabel Hercules is a 67 year old female who returns in follow-up today.  We are planning on a minimally invasive SI joint fusion and she returns with her  today with a number of intelligent questions.  The entirety of today's visit was spent in counseling imaging review and surgical decision making.            Past Medical History:     Past Medical History:   Diagnosis Date     Gastroesophageal reflux disease with esophagitis      Insomnia      MDD (major depressive disorder)      Prediabetes      Seizures (H)      SI (sacroiliac) joint dysfunction             Past Surgical History:     Past Surgical History:   Procedure Laterality Date     APPENDECTOMY       ARTHROSCOPY KNEE       C/SECTION, CLASSICAL       CARPAL TUNNEL RELEASE RT/LT       CATARACT EXTRACTION       CERVICAL DISC SURGERY      C3-5     DILATION AND CURETTAGE       EXC BRST CYST TUMR/LES OPN M/F 1/>       TONSILLECTOMY       TOTAL KNEE ARTHROPLASTY Right      wisdom teeth extraction              Social History:     Social History     Tobacco Use     Smoking status: Former     Types: Cigarettes     Quit date:      Years since quittin.4     Smokeless tobacco: Never   Vaping Use     Vaping status: Not on file   Substance Use Topics     Alcohol use: Yes     Alcohol/week: 7.0 standard drinks of alcohol     Types: 7 Glasses of wine per week            Family History:     Family History   Problem Relation Age of Onset     Anesthesia Reaction No family hx of       Venous thrombosis No family hx of             Allergies:     Allergies   Allergen Reactions     Bacitracin      Seasonal Allergies             Medications:     Current Outpatient Medications   Medication     escitalopram (LEXAPRO) 20 MG tablet     famotidine (PEPCID) 20 MG tablet     ibuprofen (ADVIL/MOTRIN) 600 MG tablet     levETIRAcetam (KEPPRA) 750 MG tablet     omeprazole (PRILOSEC) 20 MG DR capsule     traZODone (DESYREL) 50 MG tablet     Vitamin D3 (CHOLECALCIFEROL) 25 mcg (1000 units) tablet     No current facility-administered medications for this visit.             Review of Systems:   A focused musculoskeletal and neurologic ROS was performed with pertinent positives and negatives noted in the HPI.  Additional systems were also reviewed and are documented at the bottom of the note.         Physical Exam:   Vitals: /67 (BP Location: Right arm, Patient Position: Sitting, Cuff Size: Adult Regular)   Pulse 66   Wt 74.4 kg (164 lb)   LMP  (LMP Unknown)   BMI 30.00 kg/m    Musculoskeletal, Neurologic, and Spine:     I did reexamine the right SI joint and she has a positive thigh thrust lateral compression and Cherri finger sign         Imaging:   We ordered and independently reviewed new radiographs at this clinic visit. The results were discussed with the patient. Findings include:     MRI of the lumbar spine March 13 does show moderate diffuse degenerative changes    Pelvic radiographs show no obvious hip pathology    Pelvic CT scan shows some SI joint sclerosis bilaterally       Assessment and Plan:     67 year old female with right SI joint pain, planning for minimally invasive SI joint fusion     Number of questions were answered.  She is having some right hip pain.  We will get a hip MRI to make sure there is no intra-articular pathology.  Otherwise SI joint exam was positive.  Had complete anesthetic relief with recent CT-guided SI joint injection.  Planning for surgery July 6, and will contact  her after MRI of hip.    Risks and benefits of SI joint fusion were discussed including recovery, need for protected weightbearing, the risk of nonunion, risk of incomplete symptom relief and risk of implant malposition we also discussed the risk that the contralateral side could need a fusion in the future.  She expressed understanding and would like to proceed.    30 minutes was spent in counseling imaging review and note preparation  Respectfully,  Steven Nunez MD  Spine Surgery  [facility]      Again, thank you for allowing me to participate in the care of your patient.        Sincerely,        Steven Nunez MD

## 2023-06-19 ENCOUNTER — TELEPHONE (OUTPATIENT)
Dept: ORTHOPEDICS | Facility: CLINIC | Age: 68
End: 2023-06-19
Payer: MEDICARE

## 2023-06-19 NOTE — TELEPHONE ENCOUNTER
Received voicemail message from patient requesting help to get an appointment for her MRI sooner than June 25th if possible. Patient willing to go to [facility] or anywhere that might get her in sooner.

## 2023-06-22 ENCOUNTER — HOSPITAL ENCOUNTER (OUTPATIENT)
Dept: MRI IMAGING | Facility: CLINIC | Age: 68
Discharge: HOME | End: 2023-06-22
Attending: ORTHOPAEDIC SURGERY | Admitting: ORTHOPAEDIC SURGERY
Payer: MEDICARE

## 2023-06-22 DIAGNOSIS — M53.3 SI (SACROILIAC) JOINT DYSFUNCTION: ICD-10-CM

## 2023-06-22 PROCEDURE — G1010 CDSM STANSON: HCPCS

## 2023-06-22 PROCEDURE — 73721 MRI JNT OF LWR EXTRE W/O DYE: CPT | Mod: RT,ME

## 2023-06-26 ENCOUNTER — VIRTUAL VISIT (OUTPATIENT)
Dept: ORTHOPEDICS | Facility: CLINIC | Age: 68
End: 2023-06-26
Payer: MEDICARE

## 2023-06-26 DIAGNOSIS — M53.3 SI (SACROILIAC) JOINT DYSFUNCTION: Primary | ICD-10-CM

## 2023-06-26 PROCEDURE — 99442 PR PHYSICIAN TELEPHONE EVALUATION 11-20 MIN: CPT | Mod: 95 | Performed by: ORTHOPAEDIC SURGERY

## 2023-06-26 NOTE — LETTER
6/26/2023         RE: Isabel Hercules  [address]  [facility] [address]        Dear Colleague,    Thank you for referring your patient, Isabel Hercules, to the [facility] [place]. Please see a copy of my visit note below.    Virtual Visit Details    Type of service:  Telephone Visit   Phone call duration: 14 minutes     Diagnosis: SI joint dysfunction    I called Isabel to review the MRI result.  Does show some tearing of the labrum and some mild degenerative changes.  We discussed options.  I told her that the MRI findings are somewhat mild, but I suppose I cannot fully rule out the hip as a potential source of pain for her.  1 option would be to proceed with the SI joint fusion because we do feel that that is at least a portion of her pain, and if it turned out that she had some ongoing hip pain in the future it is possible she could need a hip replacement.  Another option would be to cancel the planned SI joint fusion, get a right hip injection, and have her meet with an arthroplasty specialist to get an opinion from them about how much of her current pain is coming from the hip.  She is going to consider these options.  I tried to talk through the pros and cons of this with her.  She will let us know if she would like to proceed with the SI fusion or if she would prefer to meet with one of the hip joint specialists.    Steven Nunez MD

## 2023-06-26 NOTE — NURSING NOTE
Is the patient currently in the state of [place]? YES    Visit mode:TELEPHONE    If the visit is dropped, the patient can be reconnected by: TELEPHONE VISIT: Phone number: [phone]    Will anyone else be joining the visit? Yes, Pt's  (Arsen) will be on the telephone with pt during visit per pt      How would you like to obtain your AVS? MyChart    Are changes needed to the allergy or medication list? NO    Reason for visit: RECHECK      No other pt vitals to report per pt    Carole Stock VF

## 2023-06-26 NOTE — PROGRESS NOTES
Virtual Visit Details    Type of service:  Telephone Visit   Phone call duration: 14 minutes     Diagnosis: SI joint dysfunction    I called Isabel to review the MRI result.  Does show some tearing of the labrum and some mild degenerative changes.  We discussed options.  I told her that the MRI findings are somewhat mild, but I suppose I cannot fully rule out the hip as a potential source of pain for her.  1 option would be to proceed with the SI joint fusion because we do feel that that is at least a portion of her pain, and if it turned out that she had some ongoing hip pain in the future it is possible she could need a hip replacement.  Another option would be to cancel the planned SI joint fusion, get a right hip injection, and have her meet with an arthroplasty specialist to get an opinion from them about how much of her current pain is coming from the hip.  She is going to consider these options.  I tried to talk through the pros and cons of this with her.  She will let us know if she would like to proceed with the SI fusion or if she would prefer to meet with one of the hip joint specialists.    Steven Nunez MD

## 2023-07-05 ASSESSMENT — LIFESTYLE VARIABLES: TOBACCO_USE: 1

## 2023-07-05 ASSESSMENT — ENCOUNTER SYMPTOMS: SEIZURES: 1

## 2023-07-05 NOTE — ANESTHESIA PREPROCEDURE EVALUATION
Anesthesia Pre-Procedure Evaluation    Patient: Isabel Hercules   MRN: [id number] : 1955        Procedure : Procedure(s):  Minimally invasive right sacroiliac joint fusion with stealth navigation and SIBONE implants          Past Medical History:   Diagnosis Date     Gastroesophageal reflux disease with esophagitis      Insomnia      MDD (major depressive disorder)      Prediabetes      Seizures (H)      SI (sacroiliac) joint dysfunction       Past Surgical History:   Procedure Laterality Date     APPENDECTOMY       ARTHROSCOPY KNEE       C/SECTION, CLASSICAL       CARPAL TUNNEL RELEASE RT/LT       CATARACT EXTRACTION       CERVICAL DISC SURGERY      C3-5     DILATION AND CURETTAGE       EXC BRST CYST TUMR/LES OPN M/F 1/>       TONSILLECTOMY       TOTAL KNEE ARTHROPLASTY Right      wisdom teeth extraction        Allergies   Allergen Reactions     Bacitracin      Seasonal Allergies       Social History     Tobacco Use     Smoking status: Former     Types: Cigarettes     Quit date:      Years since quittin.5     Smokeless tobacco: Never   Substance Use Topics     Alcohol use: Yes     Alcohol/week: 7.0 standard drinks of alcohol     Types: 7 Glasses of wine per week      Wt Readings from Last 1 Encounters:   23 74.4 kg (164 lb)        Anesthesia Evaluation   Pt has had prior anesthetic. Type: General, Regional and MAC.    History of anesthetic complications   With the patient's total knee arthroplasty in  she had desaturation after multiple opioids for pain control in the PACU, received a femoral block and ultimately was transferred to [facility].    ROS/MED HX  ENT/Pulmonary:     (+) tobacco use, Past use,     Neurologic: Comment: On Keppra    (+) seizures, last seizure: 5 years ago, features: complex partial seizure,  (-) no CVA and no TIA   Cardiovascular:     (+) -----Previous cardiac testing   Echo: Date: Results:    Stress Test: Date: Results:    ECG Reviewed: Date: 21  Results:  Sinus bradycardia (HR 58)  Cath: Date: Results:      METS/Exercise Tolerance: 4 - Raking leaves, gardening    Hematologic:  - neg hematologic  ROS     Musculoskeletal: Comment: Lumbar radiculopathy    SI joint dysfunction        GI/Hepatic:     (+) GERD, Asymptomatic on medication,     Renal/Genitourinary:  - neg Renal ROS     Endo: Comment: Pre-diabetes (A1c 5.7)      Psychiatric/Substance Use:     (+) psychiatric history depression and other (comment) (insomnia )     Infectious Disease:  - neg infectious disease ROS     Malignancy:  - neg malignancy ROS     Other:  - neg other ROS          Physical Exam    Airway        Mallampati: III   TM distance: > 3 FB   Neck ROM: limited   Mouth opening: > 3 cm    Respiratory Devices and Support         Dental       (+) Completely normal teeth      Cardiovascular          Rhythm and rate: regular and normal     Pulmonary           breath sounds clear to auscultation           OUTSIDE LABS:  CBC:   Lab Results   Component Value Date    WBC 4.7 06/15/2023    HGB 14.9 06/15/2023    HCT 44.8 06/15/2023     06/15/2023     BMP:   Lab Results   Component Value Date     06/15/2023    POTASSIUM 4.1 06/15/2023    CHLORIDE 104 06/15/2023    CO2 28 06/15/2023    BUN 15.1 06/15/2023    CR 0.72 06/15/2023    GLC 94 06/15/2023     COAGS: No results found for: PTT, INR, FIBR  POC: No results found for: BGM, HCG, HCGS  HEPATIC: No results found for: ALBUMIN, PROTTOTAL, ALT, AST, GGT, ALKPHOS, BILITOTAL, BILIDIRECT, GLORIA  OTHER:   Lab Results   Component Value Date    NAVID 9.6 06/15/2023       Anesthesia Plan    ASA Status:  2   NPO Status:  NPO Appropriate    Anesthesia Type: General.     - Airway: ETT   Induction: Intravenous.   Maintenance: Balanced.   Techniques and Equipment:     - Airway: Video-Laryngoscope     - Lines/Monitors: BIS     Consents    Anesthesia Plan(s) and associated risks, benefits, and realistic alternatives discussed. Questions answered and  patient/representative(s) expressed understanding.    - Discussed:     - Discussed with:  Patient         Postoperative Care    Pain management: IV analgesics, Oral pain medications, Multi-modal analgesia.   PONV prophylaxis: Ondansetron (or other 5HT-3), Dexamethasone or Solumedrol     Comments:                Mikhail Donahue MD

## 2023-07-06 ENCOUNTER — HOSPITAL ENCOUNTER (OUTPATIENT)
Facility: CLINIC | Age: 68
Discharge: HOME | End: 2023-07-06
Attending: ORTHOPAEDIC SURGERY | Admitting: ORTHOPAEDIC SURGERY
Payer: MEDICARE

## 2023-07-06 ENCOUNTER — APPOINTMENT (OUTPATIENT)
Dept: GENERAL RADIOLOGY | Facility: CLINIC | Age: 68
End: 2023-07-06
Attending: ORTHOPAEDIC SURGERY
Payer: MEDICARE

## 2023-07-06 VITALS
HEART RATE: 80 BPM | WEIGHT: 163.14 LBS | DIASTOLIC BLOOD PRESSURE: 56 MMHG | TEMPERATURE: 97.8 F | OXYGEN SATURATION: 96 % | RESPIRATION RATE: 12 BRPM | BODY MASS INDEX: 30.02 KG/M2 | HEIGHT: 62 IN | SYSTOLIC BLOOD PRESSURE: 112 MMHG

## 2023-07-06 DIAGNOSIS — M53.3 SI (SACROILIAC) JOINT DYSFUNCTION: Primary | ICD-10-CM

## 2023-07-06 LAB — GLUCOSE BLDC GLUCOMTR-MCNC: 104 MG/DL (ref 70–99)

## 2023-07-06 PROCEDURE — 250N000009 HC RX 250: Performed by: STUDENT IN AN ORGANIZED HEALTH CARE EDUCATION/TRAINING PROGRAM

## 2023-07-06 PROCEDURE — 250N000013 HC RX MED GY IP 250 OP 250 PS 637: Performed by: STUDENT IN AN ORGANIZED HEALTH CARE EDUCATION/TRAINING PROGRAM

## 2023-07-06 PROCEDURE — 250N000009 HC RX 250: Performed by: ORTHOPAEDIC SURGERY

## 2023-07-06 PROCEDURE — 82962 GLUCOSE BLOOD TEST: CPT

## 2023-07-06 PROCEDURE — 999N000180 XR SURGERY CARM FLUORO LESS THAN 5 MIN: Mod: TC

## 2023-07-06 PROCEDURE — C1713 ANCHOR/SCREW BN/BN,TIS/BN: HCPCS | Performed by: ORTHOPAEDIC SURGERY

## 2023-07-06 PROCEDURE — 370N000017 HC ANESTHESIA TECHNICAL FEE, PER MIN: Performed by: ORTHOPAEDIC SURGERY

## 2023-07-06 PROCEDURE — 272N000001 HC OR GENERAL SUPPLY STERILE: Performed by: ORTHOPAEDIC SURGERY

## 2023-07-06 PROCEDURE — 27279 ARTHRD SI JT PLMT TARTCLR DV: CPT | Mod: GC | Performed by: ORTHOPAEDIC SURGERY

## 2023-07-06 PROCEDURE — 250N000011 HC RX IP 250 OP 636: Mod: JZ | Performed by: STUDENT IN AN ORGANIZED HEALTH CARE EDUCATION/TRAINING PROGRAM

## 2023-07-06 PROCEDURE — 710N000012 HC RECOVERY PHASE 2, PER MINUTE: Performed by: ORTHOPAEDIC SURGERY

## 2023-07-06 PROCEDURE — 250N000013 HC RX MED GY IP 250 OP 250 PS 637: Performed by: PHYSICIAN ASSISTANT

## 2023-07-06 PROCEDURE — 999N000141 HC STATISTIC PRE-PROCEDURE NURSING ASSESSMENT: Performed by: ORTHOPAEDIC SURGERY

## 2023-07-06 PROCEDURE — 360N000086 HC SURGERY LEVEL 6 W/ FLUORO, PER MIN: Performed by: ORTHOPAEDIC SURGERY

## 2023-07-06 PROCEDURE — 250N000011 HC RX IP 250 OP 636: Performed by: STUDENT IN AN ORGANIZED HEALTH CARE EDUCATION/TRAINING PROGRAM

## 2023-07-06 PROCEDURE — 250N000011 HC RX IP 250 OP 636: Performed by: PHYSICIAN ASSISTANT

## 2023-07-06 PROCEDURE — 710N000010 HC RECOVERY PHASE 1, LEVEL 2, PER MIN: Performed by: ORTHOPAEDIC SURGERY

## 2023-07-06 PROCEDURE — 258N000003 HC RX IP 258 OP 636: Performed by: STUDENT IN AN ORGANIZED HEALTH CARE EDUCATION/TRAINING PROGRAM

## 2023-07-06 PROCEDURE — 250N000025 HC SEVOFLURANE, PER MIN: Performed by: ORTHOPAEDIC SURGERY

## 2023-07-06 DEVICE — IMPLANTABLE DEVICE: Type: IMPLANTABLE DEVICE | Site: BACK | Status: FUNCTIONAL

## 2023-07-06 RX ORDER — PROPOFOL 10 MG/ML
INJECTION, EMULSION INTRAVENOUS PRN
Status: DISCONTINUED | OUTPATIENT
Start: 2023-07-06 | End: 2023-07-06

## 2023-07-06 RX ORDER — LIDOCAINE HYDROCHLORIDE 20 MG/ML
INJECTION, SOLUTION INFILTRATION; PERINEURAL PRN
Status: DISCONTINUED | OUTPATIENT
Start: 2023-07-06 | End: 2023-07-06

## 2023-07-06 RX ORDER — OXYCODONE HYDROCHLORIDE 5 MG/1
5 TABLET ORAL
Status: COMPLETED | OUTPATIENT
Start: 2023-07-06 | End: 2023-07-06

## 2023-07-06 RX ORDER — SODIUM CHLORIDE, SODIUM LACTATE, POTASSIUM CHLORIDE, CALCIUM CHLORIDE 600; 310; 30; 20 MG/100ML; MG/100ML; MG/100ML; MG/100ML
INJECTION, SOLUTION INTRAVENOUS CONTINUOUS
Status: DISCONTINUED | OUTPATIENT
Start: 2023-07-06 | End: 2023-07-06 | Stop reason: HOSPADM

## 2023-07-06 RX ORDER — HYDROMORPHONE HYDROCHLORIDE 1 MG/ML
0.2 INJECTION, SOLUTION INTRAMUSCULAR; INTRAVENOUS; SUBCUTANEOUS EVERY 5 MIN PRN
Status: DISCONTINUED | OUTPATIENT
Start: 2023-07-06 | End: 2023-07-06 | Stop reason: HOSPADM

## 2023-07-06 RX ORDER — MAGNESIUM HYDROXIDE 1200 MG/15ML
LIQUID ORAL PRN
Status: DISCONTINUED | OUTPATIENT
Start: 2023-07-06 | End: 2023-07-06 | Stop reason: HOSPADM

## 2023-07-06 RX ORDER — ONDANSETRON 4 MG/1
4 TABLET, ORALLY DISINTEGRATING ORAL EVERY 8 HOURS PRN
Qty: 4 TABLET | Refills: 0 | Status: SHIPPED | OUTPATIENT
Start: 2023-07-06

## 2023-07-06 RX ORDER — ACETAMINOPHEN 325 MG/1
975 TABLET ORAL ONCE
Status: COMPLETED | OUTPATIENT
Start: 2023-07-06 | End: 2023-07-06

## 2023-07-06 RX ORDER — AMOXICILLIN 250 MG
1-2 CAPSULE ORAL 2 TIMES DAILY
Qty: 60 TABLET | Refills: 0 | Status: SHIPPED | OUTPATIENT
Start: 2023-07-06

## 2023-07-06 RX ORDER — ONDANSETRON 4 MG/1
4 TABLET, ORALLY DISINTEGRATING ORAL EVERY 30 MIN PRN
Status: DISCONTINUED | OUTPATIENT
Start: 2023-07-06 | End: 2023-07-06 | Stop reason: HOSPADM

## 2023-07-06 RX ORDER — HYDROCODONE BITARTRATE AND ACETAMINOPHEN 5; 325 MG/1; MG/1
1 TABLET ORAL
Status: CANCELLED | OUTPATIENT
Start: 2023-07-06

## 2023-07-06 RX ORDER — BUPIVACAINE HYDROCHLORIDE AND EPINEPHRINE 2.5; 5 MG/ML; UG/ML
INJECTION, SOLUTION INFILTRATION; PERINEURAL PRN
Status: DISCONTINUED | OUTPATIENT
Start: 2023-07-06 | End: 2023-07-06 | Stop reason: HOSPADM

## 2023-07-06 RX ORDER — ACETAMINOPHEN 325 MG/1
650 TABLET ORAL
Status: DISCONTINUED | OUTPATIENT
Start: 2023-07-06 | End: 2023-07-06 | Stop reason: HOSPADM

## 2023-07-06 RX ORDER — CEFAZOLIN SODIUM/WATER 2 G/20 ML
2 SYRINGE (ML) INTRAVENOUS SEE ADMIN INSTRUCTIONS
Status: DISCONTINUED | OUTPATIENT
Start: 2023-07-06 | End: 2023-07-06 | Stop reason: HOSPADM

## 2023-07-06 RX ORDER — GABAPENTIN 100 MG/1
100 CAPSULE ORAL
Status: COMPLETED | OUTPATIENT
Start: 2023-07-06 | End: 2023-07-06

## 2023-07-06 RX ORDER — CEFAZOLIN SODIUM/WATER 2 G/20 ML
2 SYRINGE (ML) INTRAVENOUS
Status: DISCONTINUED | OUTPATIENT
Start: 2023-07-06 | End: 2023-07-06 | Stop reason: HOSPADM

## 2023-07-06 RX ORDER — FENTANYL CITRATE 50 UG/ML
INJECTION, SOLUTION INTRAMUSCULAR; INTRAVENOUS PRN
Status: DISCONTINUED | OUTPATIENT
Start: 2023-07-06 | End: 2023-07-06

## 2023-07-06 RX ORDER — EPHEDRINE SULFATE 50 MG/ML
INJECTION, SOLUTION INTRAMUSCULAR; INTRAVENOUS; SUBCUTANEOUS PRN
Status: DISCONTINUED | OUTPATIENT
Start: 2023-07-06 | End: 2023-07-06

## 2023-07-06 RX ORDER — LABETALOL HYDROCHLORIDE 5 MG/ML
10 INJECTION, SOLUTION INTRAVENOUS
Status: DISCONTINUED | OUTPATIENT
Start: 2023-07-06 | End: 2023-07-06 | Stop reason: HOSPADM

## 2023-07-06 RX ORDER — METHOCARBAMOL 750 MG/1
750 TABLET, FILM COATED ORAL
Status: COMPLETED | OUTPATIENT
Start: 2023-07-06 | End: 2023-07-06

## 2023-07-06 RX ORDER — METHOCARBAMOL 750 MG/1
750 TABLET, FILM COATED ORAL 4 TIMES DAILY PRN
Qty: 40 TABLET | Refills: 0 | Status: SHIPPED | OUTPATIENT
Start: 2023-07-06 | End: 2023-08-04

## 2023-07-06 RX ORDER — ONDANSETRON 2 MG/ML
INJECTION INTRAMUSCULAR; INTRAVENOUS PRN
Status: DISCONTINUED | OUTPATIENT
Start: 2023-07-06 | End: 2023-07-06

## 2023-07-06 RX ORDER — AMOXICILLIN 250 MG
1-2 CAPSULE ORAL 2 TIMES DAILY
Qty: 30 TABLET | Refills: 0 | Status: CANCELLED | OUTPATIENT
Start: 2023-07-06

## 2023-07-06 RX ORDER — FENTANYL CITRATE 50 UG/ML
25 INJECTION, SOLUTION INTRAMUSCULAR; INTRAVENOUS EVERY 5 MIN PRN
Status: DISCONTINUED | OUTPATIENT
Start: 2023-07-06 | End: 2023-07-06 | Stop reason: HOSPADM

## 2023-07-06 RX ORDER — ACETAMINOPHEN 325 MG/1
975 TABLET ORAL ONCE
Status: DISCONTINUED | OUTPATIENT
Start: 2023-07-06 | End: 2023-07-06 | Stop reason: HOSPADM

## 2023-07-06 RX ORDER — OXYCODONE HYDROCHLORIDE 5 MG/1
5 TABLET ORAL EVERY 4 HOURS PRN
Qty: 26 TABLET | Refills: 0 | Status: SHIPPED | OUTPATIENT
Start: 2023-07-06

## 2023-07-06 RX ORDER — DEXAMETHASONE SODIUM PHOSPHATE 4 MG/ML
INJECTION, SOLUTION INTRA-ARTICULAR; INTRALESIONAL; INTRAMUSCULAR; INTRAVENOUS; SOFT TISSUE PRN
Status: DISCONTINUED | OUTPATIENT
Start: 2023-07-06 | End: 2023-07-06

## 2023-07-06 RX ORDER — LIDOCAINE 40 MG/G
CREAM TOPICAL
Status: DISCONTINUED | OUTPATIENT
Start: 2023-07-06 | End: 2023-07-06 | Stop reason: HOSPADM

## 2023-07-06 RX ORDER — SODIUM CHLORIDE, SODIUM LACTATE, POTASSIUM CHLORIDE, CALCIUM CHLORIDE 600; 310; 30; 20 MG/100ML; MG/100ML; MG/100ML; MG/100ML
INJECTION, SOLUTION INTRAVENOUS CONTINUOUS PRN
Status: DISCONTINUED | OUTPATIENT
Start: 2023-07-06 | End: 2023-07-06

## 2023-07-06 RX ORDER — ONDANSETRON 4 MG/1
4 TABLET, ORALLY DISINTEGRATING ORAL EVERY 8 HOURS PRN
Qty: 4 TABLET | Refills: 0 | Status: CANCELLED | OUTPATIENT
Start: 2023-07-06

## 2023-07-06 RX ORDER — ONDANSETRON 4 MG/1
4 TABLET, ORALLY DISINTEGRATING ORAL
Status: DISCONTINUED | OUTPATIENT
Start: 2023-07-06 | End: 2023-07-06 | Stop reason: HOSPADM

## 2023-07-06 RX ORDER — ACETAMINOPHEN 325 MG/1
650 TABLET ORAL EVERY 6 HOURS
Qty: 50 TABLET | Refills: 0 | Status: SHIPPED | OUTPATIENT
Start: 2023-07-06

## 2023-07-06 RX ORDER — FENTANYL CITRATE 50 UG/ML
50 INJECTION, SOLUTION INTRAMUSCULAR; INTRAVENOUS EVERY 5 MIN PRN
Status: DISCONTINUED | OUTPATIENT
Start: 2023-07-06 | End: 2023-07-06 | Stop reason: HOSPADM

## 2023-07-06 RX ORDER — ONDANSETRON 2 MG/ML
4 INJECTION INTRAMUSCULAR; INTRAVENOUS EVERY 30 MIN PRN
Status: DISCONTINUED | OUTPATIENT
Start: 2023-07-06 | End: 2023-07-06 | Stop reason: HOSPADM

## 2023-07-06 RX ORDER — HYDROMORPHONE HYDROCHLORIDE 1 MG/ML
0.4 INJECTION, SOLUTION INTRAMUSCULAR; INTRAVENOUS; SUBCUTANEOUS EVERY 5 MIN PRN
Status: DISCONTINUED | OUTPATIENT
Start: 2023-07-06 | End: 2023-07-06 | Stop reason: HOSPADM

## 2023-07-06 RX ADMIN — Medication 50 MG: at 11:51

## 2023-07-06 RX ADMIN — Medication 5 MG: at 12:19

## 2023-07-06 RX ADMIN — HYDROMORPHONE HYDROCHLORIDE 0.4 MG: 1 INJECTION, SOLUTION INTRAMUSCULAR; INTRAVENOUS; SUBCUTANEOUS at 13:57

## 2023-07-06 RX ADMIN — GABAPENTIN 100 MG: 100 CAPSULE ORAL at 09:41

## 2023-07-06 RX ADMIN — HYDROMORPHONE HYDROCHLORIDE 0.4 MG: 1 INJECTION, SOLUTION INTRAMUSCULAR; INTRAVENOUS; SUBCUTANEOUS at 14:08

## 2023-07-06 RX ADMIN — FENTANYL CITRATE 50 MCG: 50 INJECTION, SOLUTION INTRAMUSCULAR; INTRAVENOUS at 13:46

## 2023-07-06 RX ADMIN — Medication 5 MG: at 12:32

## 2023-07-06 RX ADMIN — Medication 5 MG: at 12:38

## 2023-07-06 RX ADMIN — Medication 5 MG: at 12:42

## 2023-07-06 RX ADMIN — SODIUM CHLORIDE, POTASSIUM CHLORIDE, SODIUM LACTATE AND CALCIUM CHLORIDE: 600; 310; 30; 20 INJECTION, SOLUTION INTRAVENOUS at 11:39

## 2023-07-06 RX ADMIN — PROPOFOL 200 MG: 10 INJECTION, EMULSION INTRAVENOUS at 11:51

## 2023-07-06 RX ADMIN — METHOCARBAMOL 750 MG: 750 TABLET ORAL at 13:49

## 2023-07-06 RX ADMIN — Medication 5 MG: at 12:14

## 2023-07-06 RX ADMIN — SUGAMMADEX 200 MG: 100 INJECTION, SOLUTION INTRAVENOUS at 13:06

## 2023-07-06 RX ADMIN — DEXAMETHASONE SODIUM PHOSPHATE 10 MG: 4 INJECTION, SOLUTION INTRA-ARTICULAR; INTRALESIONAL; INTRAMUSCULAR; INTRAVENOUS; SOFT TISSUE at 11:51

## 2023-07-06 RX ADMIN — OXYCODONE HYDROCHLORIDE 5 MG: 5 TABLET ORAL at 13:47

## 2023-07-06 RX ADMIN — ONDANSETRON 4 MG: 2 INJECTION INTRAMUSCULAR; INTRAVENOUS at 12:55

## 2023-07-06 RX ADMIN — FENTANYL CITRATE 100 MCG: 50 INJECTION, SOLUTION INTRAMUSCULAR; INTRAVENOUS at 11:51

## 2023-07-06 RX ADMIN — HYDROMORPHONE HYDROCHLORIDE 0.25 MG: 1 INJECTION, SOLUTION INTRAMUSCULAR; INTRAVENOUS; SUBCUTANEOUS at 12:50

## 2023-07-06 RX ADMIN — FENTANYL CITRATE 50 MCG: 50 INJECTION, SOLUTION INTRAMUSCULAR; INTRAVENOUS at 13:51

## 2023-07-06 RX ADMIN — LIDOCAINE HYDROCHLORIDE 100 MG: 20 INJECTION, SOLUTION INFILTRATION; PERINEURAL at 11:51

## 2023-07-06 RX ADMIN — ACETAMINOPHEN 975 MG: 325 TABLET, FILM COATED ORAL at 09:41

## 2023-07-06 RX ADMIN — Medication 2 G: at 11:35

## 2023-07-06 RX ADMIN — HYDROMORPHONE HYDROCHLORIDE 0.25 MG: 1 INJECTION, SOLUTION INTRAMUSCULAR; INTRAVENOUS; SUBCUTANEOUS at 12:34

## 2023-07-06 ASSESSMENT — ACTIVITIES OF DAILY LIVING (ADL)
ADLS_ACUITY_SCORE: 37
ADLS_ACUITY_SCORE: 35

## 2023-07-06 NOTE — ANESTHESIA CARE TRANSFER NOTE
Patient: Isabel Hercules    Procedure: Procedure(s):  Minimally invasive right sacroiliac joint fusion with stealth navigation and SIBONE implants       Diagnosis: SI (sacroiliac) joint dysfunction [M53.3]  Diagnosis Additional Information: No value filed.    Anesthesia Type:   General     Note:    Oropharynx: oropharynx clear of all foreign objects and spontaneously breathing  Level of Consciousness: awake  Oxygen Supplementation: face mask  Level of Supplemental Oxygen (L/min / FiO2): 6  Independent Airway: airway patency satisfactory and stable  Dentition: dentition unchanged  Vital Signs Stable: post-procedure vital signs reviewed and stable  Report to RN Given: handoff report given  Patient transferred to: PACU    Handoff Report: Identifed the Patient, Identified the Reponsible Provider, Reviewed the pertinent medical history, Discussed the surgical course, Reviewed Intra-OP anesthesia mangement and issues during anesthesia, Set expectations for post-procedure period and Allowed opportunity for questions and acknowledgement of understanding      Vitals:  Vitals Value Taken Time   /61 07/06/23 1320   Temp 36.4  C (97.5  F) 07/06/23 1320   Pulse 92 07/06/23 1322   Resp 10 07/06/23 1322   SpO2 96 % 07/06/23 1322   Vitals shown include unvalidated device data.    Electronically Signed By: Mikhail Donahue MD  July 6, 2023  1:23 PM

## 2023-07-06 NOTE — ANESTHESIA POSTPROCEDURE EVALUATION
Patient: Isabel Hercules    Procedure: Procedure(s):  Minimally invasive right sacroiliac joint fusion with stealth navigation and SIBONE implants       Anesthesia Type:  General    Note:  Disposition: Inpatient   Postop Pain Control: Uneventful            Sign Out: Well controlled pain   PONV: No   Neuro/Psych: Uneventful            Sign Out: Acceptable/Baseline neuro status   Airway/Respiratory: Uneventful            Sign Out: Acceptable/Baseline resp. status   CV/Hemodynamics: Uneventful            Sign Out: Acceptable CV status; No obvious hypovolemia; No obvious fluid overload   Other NRE: NONE   DID A NON-ROUTINE EVENT OCCUR? No           Last vitals:  Vitals Value Taken Time   /52 07/06/23 1415   Temp 36.4  C (97.5  F) 07/06/23 1415   Pulse 69 07/06/23 1421   Resp 16 07/06/23 1422   SpO2 95 % 07/06/23 1423   Vitals shown include unvalidated device data.    Electronically Signed By: ALESSANDRO DIAZ MD  July 6, 2023  3:11 PM

## 2023-07-06 NOTE — ANESTHESIA PROCEDURE NOTES
Airway       Patient location during procedure: OR       Procedure Start/Stop Times: 7/6/2023 11:54 AM  Staff -        Anesthesiologist:  Kristen Tomlinson MD       Resident/Fellow: Mikhail Donahue MD       CRNA: Bev Perea APRN CRNA       Performed By: resident  Consent for Airway        Urgency: elective  Indications and Patient Condition       Indications for airway management: silvano-procedural       Induction type:intravenous       Mask difficulty assessment: 1 - vent by mask    Final Airway Details       Final airway type: endotracheal airway       Successful airway: ETT - single  Endotracheal Airway Details        ETT size (mm): 7.0       Cuffed: yes       Successful intubation technique: video laryngoscopy       VL Blade Size: Glidescope 3       Grade View of Cords: 1       Adjucts: stylet       Position: Right       Measured from: gums/teeth       Secured at (cm): 21       Bite block used: None    Post intubation assessment        Placement verified by: capnometry, equal breath sounds and chest rise        Number of attempts at approach: 1       Number of other approaches attempted: 0       Secured with: pink tape       Ease of procedure: easy       Dentition: Intact    Medication(s) Administered   Medication Administration Time: 7/6/2023 11:54 AM

## 2023-07-06 NOTE — BRIEF OP NOTE
Brief Operative Note    Preop Dx:   SI (sacroiliac) joint dysfunction [M53.3]  Post op Dx:   Same  Procedure:    Procedure(s):  Minimally invasive right sacroiliac joint fusion with stealth navigation and SIBONE implants  Surgeon:     Steven Nunez MD   Assistants:    Jimenez Barcenas MD   Anesthesia:   General  EBL:    Minimal  Total IV Fluids:  See Anesthesia Record  Specimens:   None  Findings:   See Operative Dictation      Assessment and Plan: Isabel Hercules is a 68 year old female with PMH including SI Joint arthritis now s/p above procedure on 7/6/2023 with Dr. Steven Nunez.     Ortho Primary  Activity: Up with assist until independent. No excessive bending or twisting. No lifting >10 lbs x 6 weeks. No Erica lift for transfers.   Weight bearing status: WBAT.  Pain management: Norco 325mg PRN one daily for 14 days  Antibiotics: None  Diet: Begin with clear fluids and progress diet as tolerated.   DVT prophylaxis: SCDs only. No chemical DVT ppx needed.  Imaging: At f/u  Labs: none  Bracing/Splinting: None.  Dressings: Keep dressings on for 2 days and then remove  Drains: None  Marcus catheter: None  Physical Therapy/Occupational Therapy: None  Cultures: none.    Consults: None  Follow-up: Clinic with Dr. Nunez in 4-6 weeks with repeat x-rays.   Disposition: Discharge home today    Indications for assistant:  I assisted in positioning, exposure, retraction, instrumentation, hemostasis, and wound closure allowing for reduction in anesthesia time and blood loss.     Jimenez Barcenas MD  Orthopedic Surgery PGY1  [phone]    Please page me  with any questions/concerns during regular weekday hours before 4pm. If there is no response, if it is a weekend, or if it is during evening hours then please page the orthopaedic surgery resident on call.

## 2023-07-06 NOTE — PROGRESS NOTES
"Orthopedic Surgery Post Op Check: 07/06/2023    Subjective:   Pain well-controlled. No new concerns or complaints. Denies perioral numbness. Denies numbness, tingling, muscle weakness.      Objective:   /64   Pulse 83   Temp 97.5  F (36.4  C) (Oral)   Resp 12   Ht 1.575 m (5' 2\")   Wt 74 kg (163 lb 2.3 oz)   SpO2 93%   BMI 29.84 kg/m    I/O this shift:  In: 600 [I.V.:600]  Out: -   General: NAD. Resting comfortably in bed.  Respiratory: Breathing comfortably on RA.    Musculoskeletal:     Motor Strength Right Left   Hip flexion: L1, L2, L3 5/5 5/5   Knee extension: L3, L4 5/5 5/5   Ankle dorsiflexion: L4, L5 5/5 5/5   EHL: L5 5/5 5/5   Ankle plantarflexion: S1 5/5 5/5     Sensation from L1-S2 is preserved.      Assessment & Plan:   Doing well postoperatively, please see brief operative note for further plan details.     ------------------------------------------------------------------------------------------    Jimenez Barcenas MD  Orthopedic Surgery PGY1  [phone]    Please page me directly with any questions/concerns during regular weekday hours before 5 pm. If there is no response, if it is a weekend, or if it is during evening hours then please page the orthopedic surgery resident on call.      "

## 2023-07-06 NOTE — DISCHARGE INSTRUCTIONS
"Post-operative Discharge Instructions:    WOUND CARE:  You have a dressing on your incision which can be worn for up to 2 days. After the dressing has been removed, you do not need a dressing. There is \"surgical glue\" directly over the incision. This will fall off on its own, which can take up to 2 weeks. It is okay to shower and wash gently with soap and water. Do not soak in the bath. No pools, hot tubs, or lakes for 6 weeks.     After surgery, you may have a sensitive scar.  When the incision has fully healed, you may massage the scar to decrease sensitivity and help break down scar tissue. Do this up to 4 times per day.    DIET & EXERCISE:  - When you get home, you may resume your normal diet as tolerated. You may not be very hungry but try to eat small healthy meals to help you heal. Remember to drink plenty of water/fluids to help keep you hydrated.    - you should be 50% weight bearing on operative side for the first 4 weeks after surgery using walker/ crutches. After 4 weeks, you can work on returning to weight bearing as tolerated.    - You will be seen in the clinic at 6 weeks following surgery. You will not need to attend physical therapy during this time. You can focus on cardiovascular fitness by walking as much as you can tolerate. Avoid bending, lifting, and twisting. Your weight lifting restriction is 10 pounds until your first follow-up appointment.      PAIN MEDICATIONS:  For postoperative pain control, we have prescribed a variety of medications. Tylenol has been prescribed and should be taken as part of the complete pain management regimen. You may also have been prescribed a muscle relaxer to be taken as needed for muscle spasms. Other pain management techniques include icing the surgical area (for 15 minutes on, 15 minutes off) throughout the day to help with inflammation. Avoid NSAIDs (ibuprofen, Aleve, Advil, etc) for the first 12 weeks after surgery, unless approved by your surgeon.    You " also received a prescription for a narcotic medication.  This should be taken for the first few weeks following surgery.  As pain improves, decrease the amount you take (see tapering plan below). Narcotics have numerous side effects including nausea, constipation, and drowsiness. If you experience nausea, this may be relieved by taking the medication with food or a light meal. To avoid constipation, please use an over-the-counter stool softeners and drink lots of water and eat fruits and vegetables. No operating heavy machinery or driving an automobile while on narcotic medications.       Tapering narcotics: As your pain symptoms improve, focus your efforts on decreasing (tapering) use of narcotic medications. The most successful tapering strategy is to first, decrease the number of tablets you take every 4-6 hours to the minimum prescribed. Then, increase the amount of time between doses.  For example:  First, taper to   or 1 tablet every 4-6 hours.  Then, taper to   or 1 tablet every 6-8 hours.  Then, taper to   or 1 tablet every 8-10 hours.  Then, taper to   or 1 tablet every 10-12 hours.  Then, taper to   or 1 tablet at bedtime.  The bedtime dose can help with comfort during sleep and is typically the last dose to be discontinued after surgery.    Call the orthopedic clinic at [phone] several business days in advance of when you need a refill. Early refills will not be provided, and you may not take more than what is prescribed. Inform the nurse how much of the medication you are taking and how many tablets you have left.    WHEN TO CALL:  Please call or return if you experience the following:  - Fevers (temperature greater than 100.4 degrees Fahrenheit).  - Pain that is getting worse or does not respond to pain medications.  - Drainage from your wound.  - Increasing redness about the wound.  - Changes in strength or sensation to your arms/legs.   - Any other worrisome symptoms.    You may reach the clinic  by dialing [phone].  After hours, you may reach the resident on call by dialing [phone].     Same-Day Surgery   Adult Discharge Orders & Instructions     For 24 hours after surgery:  Get plenty of rest.  A responsible adult must stay with you for at least 24 hours after you leave the hospital.   Pain medication can slow your reflexes. Do not drive or use heavy equipment.  If you have weakness or tingling, don't drive or use heavy equipment until this feeling goes away.  Mixing alcohol and pain medication can cause dizziness and slow your breathing. It can even be fatal. Do not drink alcohol while taking pain medication.  Avoid strenuous or risky activities.  Ask for help when climbing stairs.   You may feel lightheaded.  If so, sit for a few minutes before standing.  Have someone help you get up.   If you have nausea (feel sick to your stomach), drink only clear liquids such as apple juice, ginger ale, broth or 7-Up.  Rest may also help.  Be sure to drink enough fluids.  Move to a regular diet as you feel able. Take pain medications with a small amount of solid food, such as toast or crackers, to avoid nausea.   A slight fever is normal. Call the doctor if your fever is over 100 F (37.7 C) (taken under the tongue) or lasts longer than 24 hours.  You may have a dry mouth, muscle aches, trouble sleeping or a sore throat.  These symptoms should go away after 24 hours.  Do not make important or legal decisions.   Pain Management:      1. Take pain medication (if prescribed) for pain as directed by your physician.        2. WARNING: If the pain medication you have been prescribed contains Tylenol  (acetaminophen), DO NOT take additional doses of Tylenol (acetaminophen).     Call your doctor for any of the followin.  Signs of infection (fever, growing tenderness at the surgery site, severe pain, a large amount of drainage or bleeding, foul-smelling drainage, redness, swelling).    2.  It has been over 8 to  10 hours since surgery and you are still not able to urinate (pee).    3.  Headache for over 24 hours.    4.  Numbness, tingling or weakness the day after surgery (if you had spinal anesthesia).  To contact a doctor, call __Dr. Berhane Nunez: Orthopedic Clinic [phone]___ or:  '   [phone] and ask for the Resident On Call for:          ____Orthopedic Surgery____ (answered 24 hours a day)  '   Emergency Department:  [facility]: [phone]  [facility]: [phone]               Rev. 10/2014

## 2023-07-07 NOTE — OP NOTE
DATE OF SURGERY: 7/6/2023    PREOPERATIVE DIAGNOSIS: SI (sacroiliac) joint dysfunction          POSTOPERATIVE DIAGNOSIS: Same    PROCEDURES:  1.  Right minimally invasive sacroiliac joint fusion with torque SI bone implants and O-arm Stealth navigation    PRIMARY SURGEON: Steven Nunez MD    FIRST ASSISTANT: Jimenez Barcenas PGY1    ANESTHESIA: General Endotracheal    COMPLICATIONS:  None.    SPECIMENS: None.    ESTIMATED BLOOD LOSS: 25 cc    INDICATIONS:                          Isabel Hercules is a 68 year old female who elected surgical treatment, and understood the indications for this surgery, as well as its risks, benefits, and alternatives as documented in the pre-operative H&P.  Specifically, we reviewed the risks and benefits of the surgery in detail. The risks include, but are not limited to, the general risks associated with anesthesia, including death, pulmonary embolism, DVT, stroke, myocardial infarction, pneumonia, and urinary tract infection. Additional risks specific to the surgery include the risk of infection, dural tear with resultant CSF leak which might necessitate placement of a drain or revision surgery or could result in headaches, nerve injury resulting in weakness or paralysis, risk of adjacent segment disease, the risks of vascular injury, need for revision surgery in the future due to one of the above issues, or risk of incomplete symptom relief. Isabel Hercules understands the risks of the surgery and wishes to proceed.  No Guarantees were given.       DESCRIPTION OF PROCEDURE:           Isabel Hercules was taken to the operating room, where the Anesthesiology Service induced satisfactory general anesthesia. Ancef was given IV.  Venous thromboembolic prophylaxis was performed with sequential devices.  The patient was placed prone on an open OSI frame with the abdomen hanging free and all bony prominences well padded.  The low back was then prepped and draped in its entirety in the  usual sterile fashion.  We then held a multidisciplinary time out in which we verified the patient, procedure, antibiotics, and operative plan.  All team members were in agreement.    We placed a percutaneous reference frame into the left posterior superior iliac spine.  We took initial spin with the navigation.  We then brought the O-arm to the head of the bed and using the navigation guidance we placed 3 pins through a 3 cm skin incision from the lateral table of the ilium through and into the sacrum.  2 pins were in the S1 body and another pin was in the S2 body.  I did take an O-arm spin to verify that the pins were well-placed.  We then used the navigation to choose appropriate length of the implants.  For each pin, I dilated with the appropriate soft tissue protector, we then drilled the outer and inner table of the ilium, and then the SI bone torque screw was placed under manual power.  These ranged from 70 mm in length to 45 mm in length of the 3 screws.  I took a final spin and verified that all of them were intraosseous.    I then injected 60 cc of quarter percent Marcaine with epinephrine and we closed the wound in multiple layers.  A sterile dressing was applied.  There were no immediately evident complications.    I was present and scrubbed for the critical portions of the procedure.    Steven Nunez MD

## 2023-07-12 ENCOUNTER — ANESTHESIA (OUTPATIENT)
Dept: SURGERY | Facility: CLINIC | Age: 68
End: 2023-07-12
Payer: MEDICARE

## 2023-08-03 ENCOUNTER — TELEPHONE (OUTPATIENT)
Dept: NEUROSURGERY | Facility: CLINIC | Age: 68
End: 2023-08-03
Payer: MEDICARE

## 2023-08-03 DIAGNOSIS — Z98.1 S/P FUSION OF SACROILIAC JOINT: Primary | ICD-10-CM

## 2023-08-03 NOTE — TELEPHONE ENCOUNTER
Pt reports  already contacted her to discuss XR appt. She will plan on getting the XR and see provider as scheduled tomorrow.       Afia Swartz, RNCC  Neurology/Neurosurgery

## 2023-08-03 NOTE — TELEPHONE ENCOUNTER
[facility] Call Center    Phone Message    May a detailed message be left on voicemail: yes     Reason for Call: Other: Patient is wondering if xrays will be taken at appointment at appointment tomorrow. If not, she may just wait until 6 week follow up. Patient says she is doing well and has started strength training. Please give her a call back at [phone].     Action Taken: Message routed to:  Other: [phone]    Travel Screening: Not Applicable

## 2023-08-04 ENCOUNTER — OFFICE VISIT (OUTPATIENT)
Dept: NEUROSURGERY | Facility: CLINIC | Age: 68
End: 2023-08-04
Payer: MEDICARE

## 2023-08-04 ENCOUNTER — ANCILLARY PROCEDURE (OUTPATIENT)
Dept: GENERAL RADIOLOGY | Facility: CLINIC | Age: 68
End: 2023-08-04
Attending: ORTHOPAEDIC SURGERY
Payer: MEDICARE

## 2023-08-04 VITALS
BODY MASS INDEX: 30.89 KG/M2 | DIASTOLIC BLOOD PRESSURE: 70 MMHG | SYSTOLIC BLOOD PRESSURE: 108 MMHG | WEIGHT: 168.9 LBS | HEART RATE: 59 BPM

## 2023-08-04 DIAGNOSIS — Z98.1 S/P FUSION OF SACROILIAC JOINT: ICD-10-CM

## 2023-08-04 DIAGNOSIS — M53.3 SI (SACROILIAC) JOINT DYSFUNCTION: Primary | ICD-10-CM

## 2023-08-04 DIAGNOSIS — M53.3 SI (SACROILIAC) JOINT DYSFUNCTION: ICD-10-CM

## 2023-08-04 PROCEDURE — 99024 POSTOP FOLLOW-UP VISIT: CPT | Performed by: ORTHOPAEDIC SURGERY

## 2023-08-04 PROCEDURE — 72170 X-RAY EXAM OF PELVIS: CPT | Performed by: RADIOLOGY

## 2023-08-04 RX ORDER — METHOCARBAMOL 750 MG/1
750 TABLET, FILM COATED ORAL 4 TIMES DAILY PRN
Qty: 40 TABLET | Refills: 0 | Status: SHIPPED | OUTPATIENT
Start: 2023-08-04

## 2023-08-04 NOTE — LETTER
2023         RE: Isabel Hercules  [address] [place]  [facility] [address]        Dear Colleague,    Thank you for referring your patient, Isabel Hercules, to the [facility] [place]. Please see a copy of my visit note below.    Spine Surgery Return Clinic Visit      Chief Complaint:   RECHECK      Interval HPI:  Symptom Profile Including: location of symptoms, onset, severity, exacerbating/alleviating factors, previous treatments:        Isabel Hercules is a 68 year old female who returns in follow-up about 4 weeks status post right SI fusion.  She is doing well and says she can stand better without pain.  She has some stiffness around the buttock area.  She has been doing some light lower extremity strengthening exercises with therapy.  She would like a refill of her Robaxin.            Past Medical History:     Past Medical History:   Diagnosis Date     Gastroesophageal reflux disease with esophagitis      Insomnia      MDD (major depressive disorder)      Prediabetes      Seizures (H)      SI (sacroiliac) joint dysfunction             Past Surgical History:     Past Surgical History:   Procedure Laterality Date     APPENDECTOMY       ARTHROSCOPY KNEE       C/SECTION, CLASSICAL       CARPAL TUNNEL RELEASE RT/LT       CATARACT EXTRACTION       CERVICAL DISC SURGERY      C3-5     DILATION AND CURETTAGE       EXC BRST CYST TUMR/LES OPN M/F 1/>       OPTICAL TRACKING SYSTEM FUSION SACRAL ILIAC Right 2023    Procedure: Minimally invasive right sacroiliac joint fusion with stealth navigation and SIBONE implants;  Surgeon: Steven Nunez MD;  Location: [facility] OR     TONSILLECTOMY       TOTAL KNEE ARTHROPLASTY Right      wisdom teeth extraction              Social History:     Social History     Tobacco Use     Smoking status: Former     Types: Cigarettes     Quit date:      Years since quittin.6     Smokeless tobacco: Never   Substance Use Topics      Alcohol use: Yes     Alcohol/week: 7.0 standard drinks of alcohol     Types: 7 Glasses of wine per week            Family History:     Family History   Problem Relation Age of Onset     Anesthesia Reaction No family hx of      Venous thrombosis No family hx of             Allergies:     Allergies   Allergen Reactions     Bacitracin      Seasonal Allergies             Medications:     Current Outpatient Medications   Medication     acetaminophen (TYLENOL) 325 MG tablet     escitalopram (LEXAPRO) 20 MG tablet     famotidine (PEPCID) 20 MG tablet     levETIRAcetam (KEPPRA) 750 MG tablet     omeprazole (PRILOSEC) 20 MG DR capsule     ondansetron (ZOFRAN ODT) 4 MG ODT tab     senna-docusate (SENOKOT-S/PERICOLACE) 8.6-50 MG tablet     traZODone (DESYREL) 50 MG tablet     Vitamin D3 (CHOLECALCIFEROL) 25 mcg (1000 units) tablet     methocarbamol (ROBAXIN) 750 MG tablet     oxyCODONE (ROXICODONE) 5 MG tablet     No current facility-administered medications for this visit.             Review of Systems:   A focused musculoskeletal and neurologic ROS was performed with pertinent positives and negatives noted in the HPI.  Additional systems were also reviewed and are documented at the bottom of the note.         Physical Exam:   Vitals: /70 (BP Location: Right arm, Patient Position: Sitting, Cuff Size: Adult Regular)   Pulse 59   Wt 76.6 kg (168 lb 14.4 oz)   LMP  (LMP Unknown)   BMI 30.89 kg/m    Musculoskeletal, Neurologic, and Spine:      Lumbar Spine:    Appearance - No gross stepoffs or deformities    Motor -     L2-3: Hip flexion 5/5 R and 5/5 L strength          L3/4:  Knee extension R 5/5 and L 5/5 strength         L4/5:  Foot dorsiflexion R 5/5 L 5/5 and              S1:  Plantarflexion/Peroneal Muscles  R 5/5 and L 5/5 strength    Sensation: intact to light touch L3-S1 distribution BLE    Incisions well healed         Imaging:   We ordered and independently reviewed new radiographs at this clinic visit. The  results were discussed with the patient. Findings include:     Pelvic radiographs show well-positioned implants       Assessment and Plan:     68 year old female with expected outcome at this time.  I do think things will continue to improve.  We talked about the recovery after surgery and I would like her to avoid things that create shear across the pelvis such as single-leg leg press type exercises, recommend she do low impact walking stationary bicycle and hip abductor exercises or plank holds for core strengthening.  I will refill the Robaxin.  Follow-up in 3 months with repeat radiographs at that time.           Respectfully,  Steven Nunez MD  Spine Surgery  [facility]      Again, thank you for allowing me to participate in the care of your patient.        Sincerely,        Steven Nunez MD

## 2023-08-04 NOTE — TELEPHONE ENCOUNTER
Medication: methocarbamol (ROBAXIN) 750 MG tablet   Sig: Take 1 tablet (750 mg) by mouth 4 times daily as needed for muscle spasms - Oral   Date last written: 7/6/23  Dispensed amount: 40  Refills: 0    Requested Pharmacy: [facility] in [place]     Pt's last office visit: 8/4/23  Next scheduled office visit: 11/3/23      Routed to covering PA to review and sign.         Afia Swartz, RNCC  Neurology/Neurosurgery

## 2023-08-04 NOTE — PROGRESS NOTES
Spine Surgery Return Clinic Visit      Chief Complaint:   RECHECK      Interval HPI:  Symptom Profile Including: location of symptoms, onset, severity, exacerbating/alleviating factors, previous treatments:        Isabel Hercules is a 68 year old female who returns in follow-up about 4 weeks status post right SI fusion.  She is doing well and says she can stand better without pain.  She has some stiffness around the buttock area.  She has been doing some light lower extremity strengthening exercises with therapy.  She would like a refill of her Robaxin.            Past Medical History:     Past Medical History:   Diagnosis Date    Gastroesophageal reflux disease with esophagitis     Insomnia     MDD (major depressive disorder)     Prediabetes     Seizures (H)     SI (sacroiliac) joint dysfunction             Past Surgical History:     Past Surgical History:   Procedure Laterality Date    APPENDECTOMY      ARTHROSCOPY KNEE      C/SECTION, CLASSICAL      CARPAL TUNNEL RELEASE RT/LT      CATARACT EXTRACTION      CERVICAL DISC SURGERY      C3-5    DILATION AND CURETTAGE      EXC BRST CYST TUMR/LES OPN M/F       OPTICAL TRACKING SYSTEM FUSION SACRAL ILIAC Right 2023    Procedure: Minimally invasive right sacroiliac joint fusion with stealth navigation and SIBONE implants;  Surgeon: Steven Nunez MD;  Location: [facility] OR    TONSILLECTOMY      TOTAL KNEE ARTHROPLASTY Right     wisdom teeth extraction              Social History:     Social History     Tobacco Use    Smoking status: Former     Types: Cigarettes     Quit date:      Years since quittin.6    Smokeless tobacco: Never   Substance Use Topics    Alcohol use: Yes     Alcohol/week: 7.0 standard drinks of alcohol     Types: 7 Glasses of wine per week            Family History:     Family History   Problem Relation Age of Onset    Anesthesia Reaction No family hx of     Venous thrombosis No family hx of             Allergies:     Allergies    Allergen Reactions    Bacitracin     Seasonal Allergies             Medications:     Current Outpatient Medications   Medication    acetaminophen (TYLENOL) 325 MG tablet    escitalopram (LEXAPRO) 20 MG tablet    famotidine (PEPCID) 20 MG tablet    levETIRAcetam (KEPPRA) 750 MG tablet    omeprazole (PRILOSEC) 20 MG DR capsule    ondansetron (ZOFRAN ODT) 4 MG ODT tab    senna-docusate (SENOKOT-S/PERICOLACE) 8.6-50 MG tablet    traZODone (DESYREL) 50 MG tablet    Vitamin D3 (CHOLECALCIFEROL) 25 mcg (1000 units) tablet    methocarbamol (ROBAXIN) 750 MG tablet    oxyCODONE (ROXICODONE) 5 MG tablet     No current facility-administered medications for this visit.             Review of Systems:   A focused musculoskeletal and neurologic ROS was performed with pertinent positives and negatives noted in the HPI.  Additional systems were also reviewed and are documented at the bottom of the note.         Physical Exam:   Vitals: /70 (BP Location: Right arm, Patient Position: Sitting, Cuff Size: Adult Regular)   Pulse 59   Wt 76.6 kg (168 lb 14.4 oz)   LMP  (LMP Unknown)   BMI 30.89 kg/m    Musculoskeletal, Neurologic, and Spine:      Lumbar Spine:    Appearance - No gross stepoffs or deformities    Motor -     L2-3: Hip flexion 5/5 R and 5/5 L strength          L3/4:  Knee extension R 5/5 and L 5/5 strength         L4/5:  Foot dorsiflexion R 5/5 L 5/5 and              S1:  Plantarflexion/Peroneal Muscles  R 5/5 and L 5/5 strength    Sensation: intact to light touch L3-S1 distribution BLE    Incisions well healed         Imaging:   We ordered and independently reviewed new radiographs at this clinic visit. The results were discussed with the patient. Findings include:     Pelvic radiographs show well-positioned implants       Assessment and Plan:     68 year old female with expected outcome at this time.  I do think things will continue to improve.  We talked about the recovery after surgery and I would like her to  avoid things that create shear across the pelvis such as single-leg leg press type exercises, recommend she do low impact walking stationary bicycle and hip abductor exercises or plank holds for core strengthening.  I will refill the Robaxin.  Follow-up in 3 months with repeat radiographs at that time.           Respectfully,  Steven Nunez MD  Spine Surgery  [facility]

## 2023-10-27 DIAGNOSIS — Z98.1 S/P FUSION OF SACROILIAC JOINT: Primary | ICD-10-CM

## 2023-11-03 ENCOUNTER — ANCILLARY PROCEDURE (OUTPATIENT)
Dept: GENERAL RADIOLOGY | Facility: CLINIC | Age: 68
End: 2023-11-03
Attending: ORTHOPAEDIC SURGERY
Payer: MEDICARE

## 2023-11-03 ENCOUNTER — OFFICE VISIT (OUTPATIENT)
Dept: NEUROSURGERY | Facility: CLINIC | Age: 68
End: 2023-11-03
Payer: MEDICARE

## 2023-11-03 VITALS
HEART RATE: 60 BPM | SYSTOLIC BLOOD PRESSURE: 105 MMHG | BODY MASS INDEX: 30.55 KG/M2 | HEIGHT: 62 IN | DIASTOLIC BLOOD PRESSURE: 57 MMHG | WEIGHT: 166 LBS | OXYGEN SATURATION: 96 %

## 2023-11-03 DIAGNOSIS — Z98.1 S/P FUSION OF SACROILIAC JOINT: ICD-10-CM

## 2023-11-03 DIAGNOSIS — Z98.1 S/P FUSION OF SACROILIAC JOINT: Primary | ICD-10-CM

## 2023-11-03 PROCEDURE — 72170 X-RAY EXAM OF PELVIS: CPT | Performed by: STUDENT IN AN ORGANIZED HEALTH CARE EDUCATION/TRAINING PROGRAM

## 2023-11-03 PROCEDURE — 99214 OFFICE O/P EST MOD 30 MIN: CPT | Performed by: ORTHOPAEDIC SURGERY

## 2023-11-03 ASSESSMENT — PAIN SCALES - GENERAL: PAINLEVEL: MODERATE PAIN (4)

## 2023-11-03 NOTE — PROGRESS NOTES
Spine Surgery Return Clinic Visit      Chief Complaint:   RECHECK (3 Month follow up)      Interval HPI:  Symptom Profile Including: location of symptoms, onset, severity, exacerbating/alleviating factors, previous treatments:        Isabel Hercules is a 68 year old female who turns in follow-up status post right SI joint fusion.  She says she is a little bit better than before surgery.  She can walk longer distances now.  She could not stand and walk very long before the operation.  She has a lot of tenderness and pain over the right gluteal muscle.  She did physical therapy before the surgery and she has been trying to do some gluteal strengthening on her own, but has not seen formal therapy at this time.            Past Medical History:     Past Medical History:   Diagnosis Date    Gastroesophageal reflux disease with esophagitis     Insomnia     MDD (major depressive disorder)     Prediabetes     Seizures (H)     SI (sacroiliac) joint dysfunction             Past Surgical History:     Past Surgical History:   Procedure Laterality Date    APPENDECTOMY      ARTHROSCOPY KNEE      C/SECTION, CLASSICAL      CARPAL TUNNEL RELEASE RT/LT      CATARACT EXTRACTION      CERVICAL DISC SURGERY      C3-5    DILATION AND CURETTAGE      EXC BRST CYST TUMR/LES OPN M/F       OPTICAL TRACKING SYSTEM FUSION SACRAL ILIAC Right 2023    Procedure: Minimally invasive right sacroiliac joint fusion with stealth navigation and SIBONE implants;  Surgeon: Steven Nunez MD;  Location: [facility]    TONSILLECTOMY      TOTAL KNEE ARTHROPLASTY Right     wisdom teeth extraction              Social History:     Social History     Tobacco Use    Smoking status: Former     Types: Cigarettes     Quit date:      Years since quittin.8    Smokeless tobacco: Never   Substance Use Topics    Alcohol use: Yes     Alcohol/week: 7.0 standard drinks of alcohol     Types: 7 Glasses of wine per week            Family History:     Family  "History   Problem Relation Age of Onset    Anesthesia Reaction No family hx of     Venous thrombosis No family hx of             Allergies:     Allergies   Allergen Reactions    Bacitracin     Seasonal Allergies             Medications:     Current Outpatient Medications   Medication    acetaminophen (TYLENOL) 325 MG tablet    escitalopram (LEXAPRO) 20 MG tablet    famotidine (PEPCID) 20 MG tablet    levETIRAcetam (KEPPRA) 750 MG tablet    methocarbamol (ROBAXIN) 750 MG tablet    omeprazole (PRILOSEC) 20 MG DR capsule    ondansetron (ZOFRAN ODT) 4 MG ODT tab    oxyCODONE (ROXICODONE) 5 MG tablet    senna-docusate (SENOKOT-S/PERICOLACE) 8.6-50 MG tablet    traZODone (DESYREL) 50 MG tablet    Vitamin D3 (CHOLECALCIFEROL) 25 mcg (1000 units) tablet     No current facility-administered medications for this visit.             Review of Systems:   A focused musculoskeletal and neurologic ROS was performed with pertinent positives and negatives noted in the HPI.  Additional systems were also reviewed and are documented at the bottom of the note.         Physical Exam:   Vitals: /57   Pulse 60   Ht 1.575 m (5' 2\")   Wt 75.3 kg (166 lb)   LMP  (LMP Unknown)   SpO2 96%   BMI 30.36 kg/m    Musculoskeletal, Neurologic, and Spine:          Lumbar Spine:    Appearance - No gross stepoffs or deformities    Motor -     L2-3: Hip flexion 5/5 R and 5/5 L strength          L3/4:  Knee extension R 5/5 and L 5/5 strength         L4/5:  Foot dorsiflexion R 5/5 L 5/5 and       EHL dorsiflexion R 4/5 L 4/5 strength         S1:  Plantarflexion/Peroneal Muscles  R 5/5 and L 5/5 strength    Sensation: intact to light touch L3-S1 distribution BLE      Neurologic:      REFLEXES Left Right                  Patella 1+ 1+   Ankle jerk 1+ 1+   Babinski No upgoing great toe No upgoing great toe   Clonus 0 beats 0 beats     Hip Exam:  Tender over the right greater trochanter and gluteal muscles    Alignment:  Patient stands with a neutral " standing sagittal balance.           Imaging:   We ordered and independently reviewed new radiographs at this clinic visit. The results were discussed with the patient. Findings include:     Pelvic AP x-ray today reviewed and compared against the August film.  There is a possible halo around the first and second implant, which looks relatively stable compared to the immediate postoperative films from August       Assessment and Plan:     68 year old female with some right gluteal pains.  I recommended hip abductor and gluteal strengthening and injected demonstrated hip up exercises and hip abductor exercises in the clinic today.  I offered her greater trochanteric injection but she declined this because past injections have not been helpful for her.  I would like her return in follow-up at the 1 year postoperative time with a pelvic CT scan to assess her fusion status           Respectfully,  Steven Nunez MD  Spine Surgery  [facility]

## 2023-11-03 NOTE — NURSING NOTE
"Isabel Hercules's goals for this visit include:   Chief Complaint   Patient presents with    RECHECK     3 Month follow up       She requests these members of her care team be copied on today's visit information: yes    PCP: No Ref-Primary, Physician    Referring Provider:  No referring provider defined for this encounter.    /57   Pulse 60   Ht 1.575 m (5' 2\")   Wt 75.3 kg (166 lb)   LMP  (LMP Unknown)   SpO2 96%   BMI 30.36 kg/m      Do you need any medication refills at today's visit? No  NANY Schwarz, NAIF ([facility])      "

## 2023-11-03 NOTE — LETTER
11/3/2023         RE: Isabel Hercules  [address]  [place] [address]        Dear Colleague,    Thank you for referring your patient, Isabel Hercules, to the [facility] [place]. Please see a copy of my visit note below.    Spine Surgery Return Clinic Visit      Chief Complaint:   RECHECK (3 Month follow up)      Interval HPI:  Symptom Profile Including: location of symptoms, onset, severity, exacerbating/alleviating factors, previous treatments:        Isabel Hercules is a 68 year old female who turns in follow-up status post right SI joint fusion.  She says she is a little bit better than before surgery.  She can walk longer distances now.  She could not stand and walk very long before the operation.  She has a lot of tenderness and pain over the right gluteal muscle.  She did physical therapy before the surgery and she has been trying to do some gluteal strengthening on her own, but has not seen formal therapy at this time.            Past Medical History:     Past Medical History:   Diagnosis Date     Gastroesophageal reflux disease with esophagitis      Insomnia      MDD (major depressive disorder)      Prediabetes      Seizures (H)      SI (sacroiliac) joint dysfunction             Past Surgical History:     Past Surgical History:   Procedure Laterality Date     APPENDECTOMY       ARTHROSCOPY KNEE       C/SECTION, CLASSICAL       CARPAL TUNNEL RELEASE RT/LT       CATARACT EXTRACTION       CERVICAL DISC SURGERY      C3-5     DILATION AND CURETTAGE       EXC BRST CYST TUMR/LES OPN M/F 1/>       OPTICAL TRACKING SYSTEM FUSION SACRAL ILIAC Right 7/6/2023    Procedure: Minimally invasive right sacroiliac joint fusion with stealth navigation and SIBONE implants;  Surgeon: Steven Nunez MD;  Location: [facility] OR     TONSILLECTOMY       TOTAL KNEE ARTHROPLASTY Right      wisdom teeth extraction              Social History:     Social History     Tobacco Use      "Smoking status: Former     Types: Cigarettes     Quit date:      Years since quittin.8     Smokeless tobacco: Never   Substance Use Topics     Alcohol use: Yes     Alcohol/week: 7.0 standard drinks of alcohol     Types: 7 Glasses of wine per week            Family History:     Family History   Problem Relation Age of Onset     Anesthesia Reaction No family hx of      Venous thrombosis No family hx of             Allergies:     Allergies   Allergen Reactions     Bacitracin      Seasonal Allergies             Medications:     Current Outpatient Medications   Medication     acetaminophen (TYLENOL) 325 MG tablet     escitalopram (LEXAPRO) 20 MG tablet     famotidine (PEPCID) 20 MG tablet     levETIRAcetam (KEPPRA) 750 MG tablet     methocarbamol (ROBAXIN) 750 MG tablet     omeprazole (PRILOSEC) 20 MG DR capsule     ondansetron (ZOFRAN ODT) 4 MG ODT tab     oxyCODONE (ROXICODONE) 5 MG tablet     senna-docusate (SENOKOT-S/PERICOLACE) 8.6-50 MG tablet     traZODone (DESYREL) 50 MG tablet     Vitamin D3 (CHOLECALCIFEROL) 25 mcg (1000 units) tablet     No current facility-administered medications for this visit.             Review of Systems:   A focused musculoskeletal and neurologic ROS was performed with pertinent positives and negatives noted in the HPI.  Additional systems were also reviewed and are documented at the bottom of the note.         Physical Exam:   Vitals: /57   Pulse 60   Ht 1.575 m (5' 2\")   Wt 75.3 kg (166 lb)   LMP  (LMP Unknown)   SpO2 96%   BMI 30.36 kg/m    Musculoskeletal, Neurologic, and Spine:          Lumbar Spine:    Appearance - No gross stepoffs or deformities    Motor -     L2-3: Hip flexion 5/5 R and 5/5 L strength          L3/4:  Knee extension R 5/5 and L 5/5 strength         L4/5:  Foot dorsiflexion R 5/5 L 5/5 and       EHL dorsiflexion R 4/5 L 4/5 strength         S1:  Plantarflexion/Peroneal Muscles  R 5/5 and L 5/5 strength    Sensation: intact to light touch " L3-S1 distribution BLE      Neurologic:      REFLEXES Left Right                  Patella 1+ 1+   Ankle jerk 1+ 1+   Babinski No upgoing great toe No upgoing great toe   Clonus 0 beats 0 beats     Hip Exam:  Tender over the right greater trochanter and gluteal muscles    Alignment:  Patient stands with a neutral standing sagittal balance.           Imaging:   We ordered and independently reviewed new radiographs at this clinic visit. The results were discussed with the patient. Findings include:     Pelvic AP x-ray today reviewed and compared against the August film.  There is a possible halo around the first and second implant, which looks relatively stable compared to the immediate postoperative films from August       Assessment and Plan:     68 year old female with some right gluteal pains.  I recommended hip abductor and gluteal strengthening and injected demonstrated hip up exercises and hip abductor exercises in the clinic today.  I offered her greater trochanteric injection but she declined this because past injections have not been helpful for her.  I would like her return in follow-up at the 1 year postoperative time with a pelvic CT scan to assess her fusion status           Respectfully,  Steven Nunez MD  Spine Surgery  [facility]      Again, thank you for allowing me to participate in the care of your patient.        Sincerely,        Steven Nunez MD

## 2023-11-12 NOTE — TELEPHONE ENCOUNTER
Patient is scheduled for surgery with Dr. Nunez    Spoke with: Patient    Date of Surgery: 6/22/23    Location: [place]    Post op: 6 weeks    Pre op with Provider: phone visit 6/16/23    H&P: Scheduled with PAC 6/15/23    Additional imaging/appointments: N/A    Surgery packet: Mailed to patient     Additional comments: N/A        Madai Garner MA on 5/31/2023 at 3:52 PM    
headache

## 2024-01-27 ENCOUNTER — HEALTH MAINTENANCE LETTER (OUTPATIENT)
Age: 69
End: 2024-01-27

## 2024-02-05 ENCOUNTER — THERAPY VISIT (OUTPATIENT)
Dept: PHYSICAL THERAPY | Facility: CLINIC | Age: 69
End: 2024-02-05
Payer: MEDICARE

## 2024-02-05 DIAGNOSIS — Z98.1 S/P FUSION OF SACROILIAC JOINT: Primary | ICD-10-CM

## 2024-02-05 PROCEDURE — 97110 THERAPEUTIC EXERCISES: CPT | Mod: GP | Performed by: PHYSICAL THERAPIST

## 2024-02-05 PROCEDURE — 97161 PT EVAL LOW COMPLEX 20 MIN: CPT | Mod: GP | Performed by: PHYSICAL THERAPIST

## 2024-02-05 NOTE — PROGRESS NOTES
PHYSICAL THERAPY EVALUATION  Type of Visit: Evaluation    See electronic medical record for Abuse and Falls Screening details.    Subjective     Pt is a 68 year old female presenting with complaints of R SI/glute   She is s/p fusion of R SIJ DOS 7/6/23. Since surgery the pain is now more intermittent.  Pain is worse in the morning but better once heats and gets moving.   Pt describes the pain as stabbing and intense ache.   Prior treatments: PT prior to fusion without benefit   The resulting functional limitations include walking >15 min, bending ie to garden or load the .   Pain is better with ice and heat, exercise   Sleep Quality: fair. Needs to move around a lot in find a comfortable spot  Current level of activity: strength training 2x/week   Goals of therapy: walk 45 min without pain, increase bending tolerance           Presenting condition or subjective complaint: Right side low back pain  Date of onset: 07/06/23    Relevant medical history: Seizures   Dates & types of surgery: SI joint stabilization 7/6/23    Prior diagnostic imaging/testing results: MRI; CT scan     Prior therapy history for the same diagnosis, illness or injury: Yes      Prior Level of Function  Transfers: Independent  Ambulation:   ADL:   IADL:     Living Environment  Social support: With a significant other or spouse   Type of home: House; 2-story   Stairs to enter the home: No   Is there a railing: No   Ramp: No   Stairs inside the home: Yes 12 Is there a railing: Yes   Help at home: None  Equipment owned:       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  Hobbies/Interests: walking    Patient goals for therapy: walk more, bend more    Pain assessment: Pain present     Objective     LUMBAR SPINE EVALUATION    PAIN: Pain Level at Rest: 1/10  Pain Level with Use: 4/10  Pain Location:  R SIJ  Pain Quality: Aching and Stabbing  Pain Frequency: intermittent or constant  Pain is Worst: Morning       ROM:   (Degrees) Left AROM Left PROM  Right AROM  "Right PROM   Hip Flexion WFL  WFL    Hip Extension       Hip Abduction       Hip Adduction       Hip Internal Rotation       Hip External Rotation       Knee Flexion       Knee Extension       Lumbar Side Bend min mod   Lumbar Flexion WFL - no pain today. Cautious and slow.    Lumbar Extension Mod loss - pain in RLB       LE Flexibility (Supine) ROM    Trunk Rotation    Hamstring 90-90    Piriformis Tight on R   Single/Double Knee to Chest        MYOTOMES:    Left Right   T12-L3 (Hip Flexion) 4+ 4   L2-4 (Quads)  5 5   L4 (Ankle DF) 5 5   L5 (Great Toe Ext)     S1 (Toe Raise and Knee Flexion) X/20  5-   X/20  4     Functional Core/Gluteal Strength:   -Bridging: small lift and no hold. No pain but notes R side feels \"different\" and she does not want to hold   -Sidelying Leg Raise (glute med): 3+/5 R, 4+/5 L. Pain on Right   -clamshell: pain on right with activation at end range as well as lying on R side   -Squat: pain in knees   -SLS: trendenlenberg on R SLS but more stable on R than L      SIJ Screen: RLE slightly longer. More equal after SIJ MET for anterior rotation     Palpation: right glute      Assessment & Plan   CLINICAL IMPRESSIONS  Medical Diagnosis: S/P fusion of sacroiliac joint    Treatment Diagnosis: R SIJ and glute pain   Impression/Assessment: Patient is a 68 year old female with R SIJ complaints.  The following significant findings have been identified: Pain, Decreased ROM/flexibility, Decreased strength, Impaired balance, Impaired gait, Impaired muscle performance, Decreased activity tolerance, and Impaired posture. These impairments interfere with their ability to perform self care tasks, recreational activities, household chores, and community mobility as compared to previous level of function.     Clinical Decision Making (Complexity):  Clinical Presentation: Stable/Uncomplicated  Clinical Presentation Rationale: based on medical and personal factors listed in PT evaluation  Clinical Decision " Making (Complexity): Low complexity    PLAN OF CARE  Treatment Interventions:  Interventions: Gait Training, Manual Therapy, Neuromuscular Re-education, Therapeutic Activity, Therapeutic Exercise, Self-Care/Home Management    Long Term Goals     PT Goal 1  Goal Identifier: Walking  Goal Description: Pt will be able to walk for 45 min without increase in symptoms <2/10 NRS  Rationale: to maximize safety and independence with performance of ADLs and functional tasks;to maximize safety and independence within the home;to maximize safety and independence within the community;to maximize safety and independence with transportation;to maximize safety and independence with self cares  Target Date: 04/01/24  PT Goal 2  Goal Identifier: Bending  Goal Description: Pt will notice 0/10 pain with bending tasks ie putting on shoes or loading the   Rationale: to maximize safety and independence within the home;to maximize safety and independence with self cares;to maximize safety and independence with performance of ADLs and functional tasks  Target Date: 04/01/24      Frequency of Treatment: 1x/week  Duration of Treatment: 8 weeks    Recommended Referrals to Other Professionals:   Education Assessment:   Learner/Method: Patient  Education Comments: Eager to participate in therapy    Risks and benefits of evaluation/treatment have been explained.   Patient/Family/caregiver agrees with Plan of Care.     Evaluation Time:     PT Eval, Low Complexity Minutes (90365): 22       Signing Clinician: Rachael Crespo, PT      [facility]                                                                                   OUTPATIENT PHYSICAL THERAPY      PLAN OF TREATMENT FOR OUTPATIENT REHABILITATION   Patient's Last Name, First Name, Isabel Godinez YOB: 1955   Provider's Name   [facility]   Medical Record No.  [id number]     Onset Date: 07/06/23   Start of Care Date: 02/05/24     Medical Diagnosis:  S/P fusion of sacroiliac joint      PT Treatment Diagnosis:  R SIJ and glute pain Plan of Treatment  Frequency/Duration: 1x/week/ 8 weeks    Certification date from 02/05/24 to 04/01/24         See note for plan of treatment details and functional goals     Rachael Crespo, PT                         I CERTIFY THE NEED FOR THESE SERVICES FURNISHED UNDER        THIS PLAN OF TREATMENT AND WHILE UNDER MY CARE     (Physician attestation of this document indicates review and certification of the therapy plan).              Referring Provider:  Steven Nunez    Initial Assessment  See Epic Evaluation- Start of Care Date: 02/05/24

## 2024-06-14 ENCOUNTER — TELEPHONE (OUTPATIENT)
Dept: NEUROSURGERY | Facility: CLINIC | Age: 69
End: 2024-06-14
Payer: MEDICARE

## 2024-06-14 DIAGNOSIS — Z98.1 S/P FUSION OF SACROILIAC JOINT: Primary | ICD-10-CM

## 2024-06-14 NOTE — PROGRESS NOTES
6/14 Called and left voicemail, provided phone number [phone] to schedule xray and ct scan before 6/21 appointment with Dr. Nunez.     [facility]   Orthopedics, Podiatry, Sports Medicine, Ent ,Eye , Audiology, Adult Endocrine & Diabetes, Nutrition & Medication Therapy Management Specialties   [facility]

## 2024-06-14 NOTE — Clinical Note
Angelo Sahu, can you help schedule Isabel's XR & CT scan prior to her appt with Dr. Nunez? She may need her appt on 6/21 rescheduled to allow time for the CT scan. I left her a detailed VM on 6/14. Thank you!

## 2024-06-14 NOTE — TELEPHONE ENCOUNTER
RN attempted to reach patient to discuss her upcoming appointment with Dr. Nunez on 6/21. Per chart review, patient is supposed to have her CT scan of the pelvis completed prior to appt. Pelvis 1/2 view XR has been ordered. Left a detailed VM requesting patient call back to get CT scheduled. May need to reschedule her appointment with Dr. Nunez to allow time for imaging.        KATHY Bauman RN Care Coordinator  Neurology/Neurosurgery/PM&R/Pain Management

## 2024-06-26 ENCOUNTER — TELEPHONE (OUTPATIENT)
Dept: NEUROSURGERY | Facility: CLINIC | Age: 69
End: 2024-06-26
Payer: MEDICARE

## 2024-06-26 NOTE — TELEPHONE ENCOUNTER
Writer attempted to reach patient to try to reschedule her 7/5 appointment with Dr. Nunez for an earlier slot in the day. We are closing the PM clinic after 11:45 am. If patient returns call to the call center, please help reschedule for an earlier slot or a different date. Patient will need XR's & CT rescheduled as well to be completed prior to her rescheduled appointment.      KATHY Bauman RN Care Coordinator  Neurology/Neurosurgery/PM&R/Pain Management

## 2024-07-19 ENCOUNTER — ANCILLARY PROCEDURE (OUTPATIENT)
Dept: CT IMAGING | Facility: CLINIC | Age: 69
End: 2024-07-19
Attending: ORTHOPAEDIC SURGERY
Payer: MEDICARE

## 2024-07-19 ENCOUNTER — ANCILLARY PROCEDURE (OUTPATIENT)
Dept: GENERAL RADIOLOGY | Facility: CLINIC | Age: 69
End: 2024-07-19
Attending: ORTHOPAEDIC SURGERY
Payer: MEDICARE

## 2024-07-19 ENCOUNTER — OFFICE VISIT (OUTPATIENT)
Dept: NEUROSURGERY | Facility: CLINIC | Age: 69
End: 2024-07-19
Payer: MEDICARE

## 2024-07-19 VITALS
SYSTOLIC BLOOD PRESSURE: 102 MMHG | HEART RATE: 61 BPM | WEIGHT: 167.2 LBS | BODY MASS INDEX: 30.77 KG/M2 | DIASTOLIC BLOOD PRESSURE: 65 MMHG | HEIGHT: 62 IN

## 2024-07-19 DIAGNOSIS — Z98.1 S/P FUSION OF SACROILIAC JOINT: ICD-10-CM

## 2024-07-19 DIAGNOSIS — M53.3 SI (SACROILIAC) JOINT DYSFUNCTION: Primary | ICD-10-CM

## 2024-07-19 PROCEDURE — 72192 CT PELVIS W/O DYE: CPT | Mod: MG | Performed by: RADIOLOGY

## 2024-07-19 PROCEDURE — 72170 X-RAY EXAM OF PELVIS: CPT | Performed by: RADIOLOGY

## 2024-07-19 PROCEDURE — 99214 OFFICE O/P EST MOD 30 MIN: CPT | Performed by: ORTHOPAEDIC SURGERY

## 2024-07-19 PROCEDURE — G1010 CDSM STANSON: HCPCS | Mod: GC | Performed by: RADIOLOGY

## 2024-07-19 NOTE — LETTER
"2024      Isabel Hercules  [address]  [facility] [address]      Dear Colleague,    Thank you for referring your patient, Isabel Hercules, to the [facility] [place]. Please see a copy of my visit note below.    Spine Surgery Return Clinic Visit      Chief Complaint:   RECHECK      Interval HPI:  Symptom Profile Including: location of symptoms, onset, severity, exacerbating/alleviating factors, previous treatments:        Isabel Hercules is a 69 year old female who returns 1 year s/p MIS R SI Fusion.  Has significant right buttock and hip pain.  Worse with activity.  Has also had the right labrum evaluated.  Had an intra-articular hip injection with minimal benefit.      Has mild to moderate pain.  Can't bend very much.  States, \"It isn't that bad.\"            Past Medical History:     Past Medical History:   Diagnosis Date     Gastroesophageal reflux disease with esophagitis      Insomnia      MDD (major depressive disorder)      Prediabetes      Seizures (H)      SI (sacroiliac) joint dysfunction             Past Surgical History:     Past Surgical History:   Procedure Laterality Date     APPENDECTOMY       ARTHROSCOPY KNEE       C/SECTION, CLASSICAL       CARPAL TUNNEL RELEASE RT/LT       CATARACT EXTRACTION       CERVICAL DISC SURGERY      C3-5     DILATION AND CURETTAGE       EXC BRST CYST TUMR/LES OPN M/F /       OPTICAL TRACKING SYSTEM FUSION SACRAL ILIAC Right 2023    Procedure: Minimally invasive right sacroiliac joint fusion with stealth navigation and SIBONE implants;  Surgeon: Steven Nunez MD;  Location: [facility] OR     TONSILLECTOMY       TOTAL KNEE ARTHROPLASTY Right      wisdom teeth extraction              Social History:     Social History     Tobacco Use     Smoking status: Former     Current packs/day: 0.00     Types: Cigarettes     Quit date:      Years since quittin.5     Smokeless tobacco: Never   Substance Use Topics     " Alcohol use: Yes     Alcohol/week: 7.0 standard drinks of alcohol     Types: 7 Glasses of wine per week            Family History:     Family History   Problem Relation Age of Onset     Anesthesia Reaction No family hx of      Venous thrombosis No family hx of             Allergies:     Allergies   Allergen Reactions     Bacitracin      Seasonal Allergies             Medications:     Current Outpatient Medications   Medication Sig Dispense Refill     acetaminophen (TYLENOL) 325 MG tablet Take 2 tablets (650 mg) by mouth every 6 hours 50 tablet 0     escitalopram (LEXAPRO) 20 MG tablet Take 20 mg by mouth every morning       famotidine (PEPCID) 20 MG tablet Take 20 mg by mouth every evening       levETIRAcetam (KEPPRA) 750 MG tablet Take 750 mg by mouth 2 times daily       omeprazole (PRILOSEC) 20 MG DR capsule Take 20 mg by mouth every morning       traZODone (DESYREL) 50 MG tablet Take 50 mg by mouth At Bedtime       Vitamin D3 (CHOLECALCIFEROL) 25 mcg (1000 units) tablet Take 1 tablet by mouth every morning       methocarbamol (ROBAXIN) 750 MG tablet Take 1 tablet (750 mg) by mouth 4 times daily as needed for muscle spasms 40 tablet 0     ondansetron (ZOFRAN ODT) 4 MG ODT tab Take 1 tablet (4 mg) by mouth every 8 hours as needed for nausea 4 tablet 0     oxyCODONE (ROXICODONE) 5 MG tablet Take 1 tablet (5 mg) by mouth every 4 hours as needed for severe pain (wean off as pain improves) 26 tablet 0     senna-docusate (SENOKOT-S/PERICOLACE) 8.6-50 MG tablet Take 1-2 tablets by mouth 2 times daily 60 tablet 0     No current facility-administered medications for this visit.             Review of Systems:   A focused musculoskeletal and neurologic ROS was performed with pertinent positives and negatives noted in the HPI.  Additional systems were also reviewed and are documented at the bottom of the note.         Physical Exam:   Vitals: /65 (BP Location: Right arm, Patient Position: Sitting, Cuff Size: Adult  "Regular)   Pulse 61   Ht 1.575 m (5' 2\")   Wt 75.8 kg (167 lb 3.2 oz)   LMP  (LMP Unknown)   BMI 30.58 kg/m    Musculoskeletal, Neurologic, and Spine:            Lumbar Spine:    Appearance - No gross stepoffs or deformities    Motor -     L2-3: Hip flexion 5/5 R and 5/5 L strength          L3/4:  Knee extension R 5/5 and L 5/5 strength         L4/5:  Foot dorsiflexion R 5/5 L 5/5 and               S1:  Plantarflexion/Peroneal Muscles  R 5/5 and L 5/5 strength    Sensation: intact to light touch L3-S1 distribution BLE         Hip Exam:  Right Trochanteric tenderness    Thigh thrust and anterior pelvic compression produce SI Pain    Alignment:  Patient stands with a neutral standing sagittal balance.           Imaging:   We ordered and independently reviewed new radiographs at this clinic visit. The results were discussed with the patient. Findings include:     CT pelvis shows halo around right SI Implants, concerning for nonunion.    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    Discussed options, will continue to monitor.  Discussed repeat SI Injection or troch bursal injection and if she desires this she will let me know and I can order this at any time. Also discussed PT and if she wishes to pursue PT she will contact the office.  I'm happy to see her back and discuss further if she has any questions.  Discussed other surgery should be a last resort option.    If she wishes to see someone regarding the labrum, I would recommend Dr. Irby.  If she wishes for a referral she will let us know via My Chart.           Respectfully,  Steven Nunez MD  Spine Surgery  [facility]      Again, thank you for allowing me to participate in the care of your patient.        Sincerely,        Steven Nunez MD  "

## 2024-07-19 NOTE — PROGRESS NOTES
"Spine Surgery Return Clinic Visit      Chief Complaint:   RECHECK      Interval HPI:  Symptom Profile Including: location of symptoms, onset, severity, exacerbating/alleviating factors, previous treatments:        Isabel Hercules is a 69 year old female who returns 1 year s/p MIS R SI Fusion.  Has significant right buttock and hip pain.  Worse with activity.  Has also had the right labrum evaluated.  Had an intra-articular hip injection with minimal benefit.      Has mild to moderate pain.  Can't bend very much.  States, \"It isn't that bad.\"            Past Medical History:     Past Medical History:   Diagnosis Date    Gastroesophageal reflux disease with esophagitis     Insomnia     MDD (major depressive disorder)     Prediabetes     Seizures (H)     SI (sacroiliac) joint dysfunction             Past Surgical History:     Past Surgical History:   Procedure Laterality Date    APPENDECTOMY      ARTHROSCOPY KNEE      C/SECTION, CLASSICAL      CARPAL TUNNEL RELEASE RT/LT      CATARACT EXTRACTION      CERVICAL DISC SURGERY      C3-5    DILATION AND CURETTAGE      EXC BRST CYST TUMR/LES OPN M/F /      OPTICAL TRACKING SYSTEM FUSION SACRAL ILIAC Right 2023    Procedure: Minimally invasive right sacroiliac joint fusion with stealth navigation and SIBONE implants;  Surgeon: Steven Nunez MD;  Location: [facility] OR    TONSILLECTOMY      TOTAL KNEE ARTHROPLASTY Right     wisdom teeth extraction              Social History:     Social History     Tobacco Use    Smoking status: Former     Current packs/day: 0.00     Types: Cigarettes     Quit date:      Years since quittin.5    Smokeless tobacco: Never   Substance Use Topics    Alcohol use: Yes     Alcohol/week: 7.0 standard drinks of alcohol     Types: 7 Glasses of wine per week            Family History:     Family History   Problem Relation Age of Onset    Anesthesia Reaction No family hx of     Venous thrombosis No family hx of             Allergies: " "    Allergies   Allergen Reactions    Bacitracin     Seasonal Allergies             Medications:     Current Outpatient Medications   Medication Sig Dispense Refill    acetaminophen (TYLENOL) 325 MG tablet Take 2 tablets (650 mg) by mouth every 6 hours 50 tablet 0    escitalopram (LEXAPRO) 20 MG tablet Take 20 mg by mouth every morning      famotidine (PEPCID) 20 MG tablet Take 20 mg by mouth every evening      levETIRAcetam (KEPPRA) 750 MG tablet Take 750 mg by mouth 2 times daily      omeprazole (PRILOSEC) 20 MG DR capsule Take 20 mg by mouth every morning      traZODone (DESYREL) 50 MG tablet Take 50 mg by mouth At Bedtime      Vitamin D3 (CHOLECALCIFEROL) 25 mcg (1000 units) tablet Take 1 tablet by mouth every morning      methocarbamol (ROBAXIN) 750 MG tablet Take 1 tablet (750 mg) by mouth 4 times daily as needed for muscle spasms 40 tablet 0    ondansetron (ZOFRAN ODT) 4 MG ODT tab Take 1 tablet (4 mg) by mouth every 8 hours as needed for nausea 4 tablet 0    oxyCODONE (ROXICODONE) 5 MG tablet Take 1 tablet (5 mg) by mouth every 4 hours as needed for severe pain (wean off as pain improves) 26 tablet 0    senna-docusate (SENOKOT-S/PERICOLACE) 8.6-50 MG tablet Take 1-2 tablets by mouth 2 times daily 60 tablet 0     No current facility-administered medications for this visit.             Review of Systems:   A focused musculoskeletal and neurologic ROS was performed with pertinent positives and negatives noted in the HPI.  Additional systems were also reviewed and are documented at the bottom of the note.         Physical Exam:   Vitals: /65 (BP Location: Right arm, Patient Position: Sitting, Cuff Size: Adult Regular)   Pulse 61   Ht 1.575 m (5' 2\")   Wt 75.8 kg (167 lb 3.2 oz)   LMP  (LMP Unknown)   BMI 30.58 kg/m    Musculoskeletal, Neurologic, and Spine:            Lumbar Spine:    Appearance - No gross stepoffs or deformities    Motor -     L2-3: Hip flexion 5/5 R and 5/5 L strength          L3/4: "  Knee extension R 5/5 and L 5/5 strength         L4/5:  Foot dorsiflexion R 5/5 L 5/5 and               S1:  Plantarflexion/Peroneal Muscles  R 5/5 and L 5/5 strength    Sensation: intact to light touch L3-S1 distribution BLE         Hip Exam:  Right Trochanteric tenderness    Thigh thrust and anterior pelvic compression produce SI Pain    Alignment:  Patient stands with a neutral standing sagittal balance.           Imaging:   We ordered and independently reviewed new radiographs at this clinic visit. The results were discussed with the patient. Findings include:     CT pelvis shows halo around right SI Implants, concerning for nonunion.    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    Discussed options, will continue to monitor.  Discussed repeat SI Injection or troch bursal injection and if she desires this she will let me know and I can order this at any time. Also discussed PT and if she wishes to pursue PT she will contact the office.  I'm happy to see her back and discuss further if she has any questions.  Discussed other surgery should be a last resort option.    If she wishes to see someone regarding the labrum, I would recommend Dr. Irby.  If she wishes for a referral she will let us know via My Chart.           Respectfully,  Steven Nunez MD  Spine Surgery  [facility]

## 2025-02-01 ENCOUNTER — HEALTH MAINTENANCE LETTER (OUTPATIENT)
Age: 70
End: 2025-02-01

## (undated) DEVICE — GLOVE BIOGEL PI MICRO SZ 7.5 48575

## (undated) DEVICE — PACK UNIVERSAL SPLIT 29131

## (undated) DEVICE — SYR 50ML LL W/O NDL 309653

## (undated) DEVICE — SOL WATER IRRIG 1000ML BOTTLE 2F7114

## (undated) DEVICE — STPL SKIN 35W ROTATING HEAD PRW35

## (undated) DEVICE — PREP CHLORAPREP 26ML TINTED HI-LITE ORANGE 930815

## (undated) DEVICE — DRAPE POUCH IRR 1016

## (undated) DEVICE — GOWN IMPERVIOUS SPECIALTY XLG/XLONG 32474

## (undated) DEVICE — SU MONOCRYL 3-0 PS-2 18" UND Y497G

## (undated) DEVICE — SU VICRYL 2-0 CT-2 8X18" UND D8144

## (undated) DEVICE — DRSG TEGADERM 4X4 3/4" 1626W

## (undated) DEVICE — POSITIONER ARMBOARD FOAM 1PAIR LF FP-ARMB1

## (undated) DEVICE — LINEN TOWEL PACK X5 5464

## (undated) DEVICE — PIN PERCUTANEOUS NAVIGATION FOR SPINE O-ARM150MM 9733236

## (undated) DEVICE — SU VICRYL 1 CT-1 CR 8X18" J741D

## (undated) DEVICE — Device

## (undated) DEVICE — SU DERMABOND ADVANCED .7ML DNX12

## (undated) DEVICE — SUCTION MANIFOLD NEPTUNE 2 SYS 4 PORT 0702-020-000

## (undated) DEVICE — LINEN BACK PACK 5440

## (undated) DEVICE — DECANTER TRANSFER DEVICE 2008S

## (undated) DEVICE — DRAPE O ARM TUBE 9732722

## (undated) DEVICE — SOL ISOPROPYL RUBBING ALCOHOL USP 70% 4OZ HDX-20 I0020

## (undated) DEVICE — GLOVE BIOGEL PI MICRO INDICATOR UNDERGLOVE SZ 8.0 48980

## (undated) DEVICE — SOL NACL 0.9% IRRIG 1000ML BOTTLE 2F7124

## (undated) DEVICE — MARKER SPHERES PASSIVE MEDT PACK 5 8801075

## (undated) RX ORDER — BUPIVACAINE HYDROCHLORIDE AND EPINEPHRINE 2.5; 5 MG/ML; UG/ML
INJECTION, SOLUTION EPIDURAL; INFILTRATION; INTRACAUDAL; PERINEURAL
Status: DISPENSED
Start: 2023-07-06

## (undated) RX ORDER — FENTANYL CITRATE 50 UG/ML
INJECTION, SOLUTION INTRAMUSCULAR; INTRAVENOUS
Status: DISPENSED
Start: 2023-07-06

## (undated) RX ORDER — HYDROMORPHONE HYDROCHLORIDE 1 MG/ML
INJECTION, SOLUTION INTRAMUSCULAR; INTRAVENOUS; SUBCUTANEOUS
Status: DISPENSED
Start: 2023-07-06

## (undated) RX ORDER — EPHEDRINE SULFATE 50 MG/ML
INJECTION, SOLUTION INTRAMUSCULAR; INTRAVENOUS; SUBCUTANEOUS
Status: DISPENSED
Start: 2023-07-06

## (undated) RX ORDER — CEFAZOLIN SODIUM/WATER 2 G/20 ML
SYRINGE (ML) INTRAVENOUS
Status: DISPENSED
Start: 2023-07-06

## (undated) RX ORDER — GABAPENTIN 100 MG/1
CAPSULE ORAL
Status: DISPENSED
Start: 2023-07-06

## (undated) RX ORDER — ACETAMINOPHEN 325 MG/1
TABLET ORAL
Status: DISPENSED
Start: 2023-07-06

## (undated) RX ORDER — METHOCARBAMOL 750 MG/1
TABLET, FILM COATED ORAL
Status: DISPENSED
Start: 2023-07-06

## (undated) RX ORDER — OXYCODONE HYDROCHLORIDE 5 MG/1
TABLET ORAL
Status: DISPENSED
Start: 2023-07-06